# Patient Record
Sex: FEMALE | Race: ASIAN | Employment: UNEMPLOYED | URBAN - METROPOLITAN AREA
[De-identification: names, ages, dates, MRNs, and addresses within clinical notes are randomized per-mention and may not be internally consistent; named-entity substitution may affect disease eponyms.]

---

## 2018-12-31 ENCOUNTER — OFFICE VISIT (OUTPATIENT)
Dept: FAMILY MEDICINE CLINIC | Age: 28
End: 2018-12-31
Payer: COMMERCIAL

## 2018-12-31 VITALS
DIASTOLIC BLOOD PRESSURE: 80 MMHG | WEIGHT: 142.8 LBS | SYSTOLIC BLOOD PRESSURE: 120 MMHG | OXYGEN SATURATION: 98 % | RESPIRATION RATE: 12 BRPM | HEART RATE: 84 BPM | HEIGHT: 65 IN | BODY MASS INDEX: 23.79 KG/M2

## 2018-12-31 DIAGNOSIS — Z00.00 HEALTHCARE MAINTENANCE: ICD-10-CM

## 2018-12-31 DIAGNOSIS — Z00.00 HEALTHCARE MAINTENANCE: Primary | ICD-10-CM

## 2018-12-31 LAB
ANION GAP SERPL CALCULATED.3IONS-SCNC: 12 MMOL/L (ref 3–16)
BUN BLDV-MCNC: 10 MG/DL (ref 7–20)
CALCIUM SERPL-MCNC: 9.8 MG/DL (ref 8.3–10.6)
CHLORIDE BLD-SCNC: 101 MMOL/L (ref 99–110)
CHOLESTEROL, TOTAL: 168 MG/DL (ref 0–199)
CO2: 25 MMOL/L (ref 21–32)
CREAT SERPL-MCNC: 0.6 MG/DL (ref 0.6–1.1)
GFR AFRICAN AMERICAN: >60
GFR NON-AFRICAN AMERICAN: >60
GLUCOSE BLD-MCNC: 94 MG/DL (ref 70–99)
HDLC SERPL-MCNC: 51 MG/DL (ref 40–60)
LDL CHOLESTEROL CALCULATED: 95 MG/DL
POTASSIUM SERPL-SCNC: 4.3 MMOL/L (ref 3.5–5.1)
SODIUM BLD-SCNC: 138 MMOL/L (ref 136–145)
TRIGL SERPL-MCNC: 108 MG/DL (ref 0–150)
VLDLC SERPL CALC-MCNC: 22 MG/DL

## 2018-12-31 PROCEDURE — 99385 PREV VISIT NEW AGE 18-39: CPT | Performed by: FAMILY MEDICINE

## 2018-12-31 PROCEDURE — 90715 TDAP VACCINE 7 YRS/> IM: CPT | Performed by: FAMILY MEDICINE

## 2018-12-31 PROCEDURE — 90471 IMMUNIZATION ADMIN: CPT | Performed by: FAMILY MEDICINE

## 2018-12-31 ASSESSMENT — PATIENT HEALTH QUESTIONNAIRE - PHQ9
2. FEELING DOWN, DEPRESSED OR HOPELESS: 0
1. LITTLE INTEREST OR PLEASURE IN DOING THINGS: 0
SUM OF ALL RESPONSES TO PHQ QUESTIONS 1-9: 0
SUM OF ALL RESPONSES TO PHQ9 QUESTIONS 1 & 2: 0
SUM OF ALL RESPONSES TO PHQ QUESTIONS 1-9: 0

## 2018-12-31 NOTE — PATIENT INSTRUCTIONS
your doctor if:    · You have vaginal bleeding or pain for more than 2 days after the test. It is normal to have a small amount of bleeding for a day or two after the test.   Where can you learn more? Go to [email or web address] and sign in to your [employer] account. Enter X042 in the [facility] box to learn more about \"Pap Test: Care Instructions. \"     If you do not have an account, please click on the \"Sign Up Now\" link. Current as of: March 28, 2018  Content Version: 11.8  © 8737-3038 [employer]. Care instructions adapted under license by [facility] ([facility]). If you have questions about a medical condition or this instruction, always ask your healthcare professional. [address] any warranty or liability for your use of this information. Patient Education        Learning About Cervical Cancer Screening  What is a cervical cancer screening test?    Cervical cancer screening tests check for cancer of the cervix. The cervix is the lower part of the uterus that opens into the vagina. The test can help your doctor find early changes in the cells that could lead to cancer. Two tests can be used to screen for cervical cancer. They may be used alone or together. A Pap test.   This test looks for changes in the cells of the cervix. Abnormal cells may be a sign of cancer. A human papillomavirus (HPV) test.   This test looks for the HPV virus. Some types of HPV can cause cancer. During either test, the doctor or nurse will insert a tool called a speculum into your vagina. The speculum gently spreads apart the vaginal walls. It allows your doctor to see inside the vagina and the cervix. He or she uses a small swab or brush to collect cell samples from your cervix. Try to schedule the test when you're not having your period.  To get ready for the test, avoid douches, tampons, vaginal medicines, sprays, and powders for at least a day before you have the

## 2018-12-31 NOTE — PROGRESS NOTES
normal.   Mouth/Throat: Oropharynx is clear and moist and mucous membranes are normal. No oropharyngeal exudate. Eyes: Pupils are equal, round, and reactive to light. Lids are normal. Right eye exhibits no discharge. Left eye exhibits no discharge. Neck: Normal range of motion. Neck supple. No tracheal deviation present. No thyromegaly present. Cardiovascular: Normal rate, regular rhythm and normal heart sounds. No murmur heard. Pulmonary/Chest: Effort normal and breath sounds normal. No respiratory distress. Abdominal: Soft. Bowel sounds are normal. She exhibits no distension. There is no hepatosplenomegaly. There is no tenderness. Musculoskeletal: She exhibits no edema. Normal gait, normal muscle tone   Lymphadenopathy:     She has no cervical adenopathy. Neurological: She is alert. No cranial nerve deficit. Skin: Skin is warm and dry. No rash noted. Psychiatric: She has a normal mood and affect. Her behavior is normal. Judgment normal.         Assessment/Plan     1. Healthcare maintenance  Annual physical exam done today. Counseled on preventative care and a healthy lifestlye. Discussed optimal calcium intake emphasizing the importance of getting as much of the daily calcium requirement as possible from dietary sources. Immunization history reviewed. The patient was given information on a Pap smear to review as she was not familiar with this procedure. She will follow-up for this when ready. She declined a flu shot. - Lipid Panel; Future  - Basic Metabolic Panel; Future  - HIV Screen; Future  - Tdap (age 6y and older)  ([employer] Extension)      Discussed medications with patient, who voiced understanding of their use and indications. All questions answered.     Return in about 1 year (around 12/31/2019) for Physical.

## 2019-01-01 LAB
HIV AG/AB: NORMAL
HIV ANTIGEN: NORMAL
HIV-1 ANTIBODY: NORMAL
HIV-2 AB: NORMAL

## 2019-12-13 LAB
ABO/RH: NORMAL
ANTIBODY SCREEN: NORMAL
HEPATITIS C ANTIBODY INTERPRETATION: NORMAL

## 2019-12-14 LAB
BASOPHILS ABSOLUTE: 0 K/UL (ref 0–0.2)
BASOPHILS RELATIVE PERCENT: 0.4 %
EOSINOPHILS ABSOLUTE: 0 K/UL (ref 0–0.6)
EOSINOPHILS RELATIVE PERCENT: 0.5 %
HCT VFR BLD CALC: 35.2 % (ref 36–48)
HEMOGLOBIN: 12.2 G/DL (ref 12–16)
HEPATITIS B SURFACE ANTIGEN INTERPRETATION: ABNORMAL
HIV AG/AB: NORMAL
HIV ANTIGEN: NORMAL
HIV-1 ANTIBODY: NORMAL
HIV-2 AB: NORMAL
LYMPHOCYTES ABSOLUTE: 1.6 K/UL (ref 1–5.1)
LYMPHOCYTES RELATIVE PERCENT: 18.3 %
MCH RBC QN AUTO: 31.2 PG (ref 26–34)
MCHC RBC AUTO-ENTMCNC: 34.8 G/DL (ref 31–36)
MCV RBC AUTO: 89.7 FL (ref 80–100)
MONOCYTES ABSOLUTE: 0.6 K/UL (ref 0–1.3)
MONOCYTES RELATIVE PERCENT: 6.9 %
NEUTROPHILS ABSOLUTE: 6.5 K/UL (ref 1.7–7.7)
NEUTROPHILS RELATIVE PERCENT: 73.9 %
PDW BLD-RTO: 13 % (ref 12.4–15.4)
PLATELET # BLD: 159 K/UL (ref 135–450)
PMV BLD AUTO: 11.1 FL (ref 5–10.5)
RBC # BLD: 3.92 M/UL (ref 4–5.2)
RUBELLA ANTIBODY IGG: >500 IU/ML
TOTAL SYPHILLIS IGG/IGM: ABNORMAL
WBC # BLD: 8.7 K/UL (ref 4–11)

## 2019-12-15 LAB — URINE CULTURE, ROUTINE: NORMAL

## 2020-02-21 LAB
GLUCOSE CHALLENGE: 126 MG/DL
HCT VFR BLD CALC: 34.7 % (ref 36–48)
HEMOGLOBIN: 11.9 G/DL (ref 12–16)
MCH RBC QN AUTO: 32.5 PG (ref 26–34)
MCHC RBC AUTO-ENTMCNC: 34.2 G/DL (ref 31–36)
MCV RBC AUTO: 95.2 FL (ref 80–100)
PDW BLD-RTO: 14.3 % (ref 12.4–15.4)
PLATELET # BLD: 149 K/UL (ref 135–450)
PMV BLD AUTO: 11.1 FL (ref 5–10.5)
RBC # BLD: 3.64 M/UL (ref 4–5.2)
WBC # BLD: 7.5 K/UL (ref 4–11)

## 2020-05-14 ENCOUNTER — ANESTHESIA EVENT (OUTPATIENT)
Dept: LABOR AND DELIVERY | Age: 30
End: 2020-05-14
Payer: COMMERCIAL

## 2020-05-14 ENCOUNTER — ANESTHESIA (OUTPATIENT)
Dept: LABOR AND DELIVERY | Age: 30
End: 2020-05-14
Payer: COMMERCIAL

## 2020-05-14 ENCOUNTER — HOSPITAL ENCOUNTER (INPATIENT)
Age: 30
LOS: 2 days | Discharge: HOME | End: 2020-05-17
Attending: OBSTETRICS & GYNECOLOGY | Admitting: OBSTETRICS & GYNECOLOGY
Payer: COMMERCIAL

## 2020-05-14 LAB
HCT VFR BLD CALC: 34.6 % (ref 36–48)
HEMOGLOBIN: 11.7 G/DL (ref 12–16)
MCH RBC QN AUTO: 31.3 PG (ref 26–34)
MCHC RBC AUTO-ENTMCNC: 33.9 G/DL (ref 31–36)
MCV RBC AUTO: 92.5 FL (ref 80–100)
PDW BLD-RTO: 13.2 % (ref 12.4–15.4)
PLATELET # BLD: 163 K/UL (ref 135–450)
PMV BLD AUTO: 10.4 FL (ref 5–10.5)
RBC # BLD: 3.74 M/UL (ref 4–5.2)
WBC # BLD: 9.3 K/UL (ref 4–11)

## 2020-05-14 PROCEDURE — 86901 BLOOD TYPING SEROLOGIC RH(D): CPT

## 2020-05-14 PROCEDURE — 86900 BLOOD TYPING SEROLOGIC ABO: CPT

## 2020-05-14 PROCEDURE — 86850 RBC ANTIBODY SCREEN: CPT

## 2020-05-14 PROCEDURE — 85027 COMPLETE CBC AUTOMATED: CPT

## 2020-05-14 PROCEDURE — 2580000003 HC RX 258: Performed by: OBSTETRICS & GYNECOLOGY

## 2020-05-14 PROCEDURE — 86923 COMPATIBILITY TEST ELECTRIC: CPT

## 2020-05-14 PROCEDURE — 86780 TREPONEMA PALLIDUM: CPT

## 2020-05-14 PROCEDURE — 80307 DRUG TEST PRSMV CHEM ANLYZR: CPT

## 2020-05-14 RX ORDER — DOCUSATE SODIUM 100 MG/1
100 CAPSULE, LIQUID FILLED ORAL 2 TIMES DAILY
Status: DISCONTINUED | OUTPATIENT
Start: 2020-05-14 | End: 2020-05-15

## 2020-05-14 RX ORDER — LIDOCAINE HYDROCHLORIDE 10 MG/ML
30 INJECTION, SOLUTION EPIDURAL; INFILTRATION; INTRACAUDAL; PERINEURAL PRN
Status: DISCONTINUED | OUTPATIENT
Start: 2020-05-14 | End: 2020-05-15

## 2020-05-14 RX ORDER — PNV NO.95/FERROUS FUM/FOLIC AC 28MG-0.8MG
TABLET ORAL
COMMUNITY
End: 2021-10-26

## 2020-05-14 RX ORDER — SODIUM CHLORIDE, SODIUM LACTATE, POTASSIUM CHLORIDE, CALCIUM CHLORIDE 600; 310; 30; 20 MG/100ML; MG/100ML; MG/100ML; MG/100ML
INJECTION, SOLUTION INTRAVENOUS CONTINUOUS
Status: DISCONTINUED | OUTPATIENT
Start: 2020-05-14 | End: 2020-05-15

## 2020-05-14 RX ORDER — BUTORPHANOL TARTRATE 1 MG/ML
1 INJECTION, SOLUTION INTRAMUSCULAR; INTRAVENOUS
Status: DISCONTINUED | OUTPATIENT
Start: 2020-05-14 | End: 2020-05-15

## 2020-05-14 RX ORDER — TERBUTALINE SULFATE 1 MG/ML
0.25 INJECTION, SOLUTION SUBCUTANEOUS ONCE
Status: DISCONTINUED | OUTPATIENT
Start: 2020-05-14 | End: 2020-05-15

## 2020-05-14 RX ORDER — ONDANSETRON 2 MG/ML
4 INJECTION INTRAMUSCULAR; INTRAVENOUS EVERY 6 HOURS PRN
Status: DISCONTINUED | OUTPATIENT
Start: 2020-05-14 | End: 2020-05-15

## 2020-05-14 RX ADMIN — SODIUM CHLORIDE, POTASSIUM CHLORIDE, SODIUM LACTATE AND CALCIUM CHLORIDE: 600; 310; 30; 20 INJECTION, SOLUTION INTRAVENOUS at 23:32

## 2020-05-15 ENCOUNTER — ANESTHESIA (OUTPATIENT)
Dept: LABOR AND DELIVERY | Age: 30
End: 2020-05-15
Payer: COMMERCIAL

## 2020-05-15 ENCOUNTER — ANESTHESIA EVENT (OUTPATIENT)
Dept: LABOR AND DELIVERY | Age: 30
End: 2020-05-15
Payer: COMMERCIAL

## 2020-05-15 VITALS
DIASTOLIC BLOOD PRESSURE: 68 MMHG | OXYGEN SATURATION: 100 % | RESPIRATION RATE: 15 BRPM | SYSTOLIC BLOOD PRESSURE: 124 MMHG

## 2020-05-15 LAB
ABO/RH: NORMAL
AMPHETAMINE SCREEN, URINE: NORMAL
ANTIBODY SCREEN: NORMAL
BARBITURATE SCREEN URINE: NORMAL
BENZODIAZEPINE SCREEN, URINE: NORMAL
BUPRENORPHINE URINE: NORMAL
CANNABINOID SCREEN URINE: NORMAL
COCAINE METABOLITE SCREEN URINE: NORMAL
HCT VFR BLD CALC: 36.2 % (ref 36–48)
HEMOGLOBIN: 12.3 G/DL (ref 12–16)
Lab: NORMAL
MCH RBC QN AUTO: 31.4 PG (ref 26–34)
MCHC RBC AUTO-ENTMCNC: 34.1 G/DL (ref 31–36)
MCV RBC AUTO: 92.1 FL (ref 80–100)
METHADONE SCREEN, URINE: NORMAL
OPIATE SCREEN URINE: NORMAL
OXYCODONE URINE: NORMAL
PDW BLD-RTO: 13.6 % (ref 12.4–15.4)
PH UA: 6
PHENCYCLIDINE SCREEN URINE: NORMAL
PLATELET # BLD: 154 K/UL (ref 135–450)
PLATELET SLIDE REVIEW: ADEQUATE
PMV BLD AUTO: 10.3 FL (ref 5–10.5)
PROPOXYPHENE SCREEN: NORMAL
RBC # BLD: 3.93 M/UL (ref 4–5.2)
SLIDE REVIEW: ABNORMAL
SPECIMEN STATUS: NORMAL
TOTAL SYPHILLIS IGG/IGM: NORMAL
WBC # BLD: 15 K/UL (ref 4–11)

## 2020-05-15 PROCEDURE — 10D17ZZ EXTRACTION OF PRODUCTS OF CONCEPTION, RETAINED, VIA NATURAL OR ARTIFICIAL OPENING: ICD-10-PCS | Performed by: OBSTETRICS & GYNECOLOGY

## 2020-05-15 PROCEDURE — 3600000002 HC SURGERY LEVEL 2 BASE: Performed by: OBSTETRICS & GYNECOLOGY

## 2020-05-15 PROCEDURE — 85027 COMPLETE CBC AUTOMATED: CPT

## 2020-05-15 PROCEDURE — 2580000003 HC RX 258: Performed by: OBSTETRICS & GYNECOLOGY

## 2020-05-15 PROCEDURE — 3700000001 HC ADD 15 MINUTES (ANESTHESIA): Performed by: OBSTETRICS & GYNECOLOGY

## 2020-05-15 PROCEDURE — 3700000000 HC ANESTHESIA ATTENDED CARE: Performed by: OBSTETRICS & GYNECOLOGY

## 2020-05-15 PROCEDURE — 1200000000 HC SEMI PRIVATE

## 2020-05-15 PROCEDURE — 3600000012 HC SURGERY LEVEL 2 ADDTL 15MIN: Performed by: OBSTETRICS & GYNECOLOGY

## 2020-05-15 PROCEDURE — 2500000003 HC RX 250 WO HCPCS: Performed by: NURSE ANESTHETIST, CERTIFIED REGISTERED

## 2020-05-15 PROCEDURE — 6370000000 HC RX 637 (ALT 250 FOR IP): Performed by: OBSTETRICS & GYNECOLOGY

## 2020-05-15 PROCEDURE — 6360000002 HC RX W HCPCS: Performed by: OBSTETRICS & GYNECOLOGY

## 2020-05-15 PROCEDURE — 7100000001 HC PACU RECOVERY - ADDTL 15 MIN: Performed by: OBSTETRICS & GYNECOLOGY

## 2020-05-15 PROCEDURE — 88307 TISSUE EXAM BY PATHOLOGIST: CPT

## 2020-05-15 PROCEDURE — 3E033VJ INTRODUCTION OF OTHER HORMONE INTO PERIPHERAL VEIN, PERCUTANEOUS APPROACH: ICD-10-PCS | Performed by: OBSTETRICS & GYNECOLOGY

## 2020-05-15 PROCEDURE — 2580000003 HC RX 258: Performed by: NURSE ANESTHETIST, CERTIFIED REGISTERED

## 2020-05-15 PROCEDURE — 0KQM0ZZ REPAIR PERINEUM MUSCLE, OPEN APPROACH: ICD-10-PCS | Performed by: OBSTETRICS & GYNECOLOGY

## 2020-05-15 PROCEDURE — 7200000001 HC VAGINAL DELIVERY

## 2020-05-15 PROCEDURE — 7100000000 HC PACU RECOVERY - FIRST 15 MIN: Performed by: OBSTETRICS & GYNECOLOGY

## 2020-05-15 PROCEDURE — 6360000002 HC RX W HCPCS: Performed by: NURSE ANESTHETIST, CERTIFIED REGISTERED

## 2020-05-15 RX ORDER — OXYTOCIN 10 [USP'U]/ML
INJECTION, SOLUTION INTRAMUSCULAR; INTRAVENOUS PRN
Status: DISCONTINUED | OUTPATIENT
Start: 2020-05-15 | End: 2020-05-15 | Stop reason: SDUPTHER

## 2020-05-15 RX ORDER — FENTANYL CITRATE 50 UG/ML
INJECTION, SOLUTION INTRAMUSCULAR; INTRAVENOUS PRN
Status: DISCONTINUED | OUTPATIENT
Start: 2020-05-15 | End: 2020-05-15 | Stop reason: SDUPTHER

## 2020-05-15 RX ORDER — CARBOPROST TROMETHAMINE 250 UG/ML
250 INJECTION, SOLUTION INTRAMUSCULAR
Status: ACTIVE | OUTPATIENT
Start: 2020-05-15 | End: 2020-05-15

## 2020-05-15 RX ORDER — SODIUM CHLORIDE, SODIUM LACTATE, POTASSIUM CHLORIDE, CALCIUM CHLORIDE 600; 310; 30; 20 MG/100ML; MG/100ML; MG/100ML; MG/100ML
INJECTION, SOLUTION INTRAVENOUS CONTINUOUS PRN
Status: DISCONTINUED | OUTPATIENT
Start: 2020-05-15 | End: 2020-05-15 | Stop reason: SDUPTHER

## 2020-05-15 RX ORDER — MISOPROSTOL 100 UG/1
800 TABLET ORAL PRN
Status: DISCONTINUED | OUTPATIENT
Start: 2020-05-15 | End: 2020-05-17 | Stop reason: HOSPADM

## 2020-05-15 RX ORDER — ONDANSETRON 4 MG/1
4 TABLET, ORALLY DISINTEGRATING ORAL EVERY 6 HOURS PRN
Status: DISCONTINUED | OUTPATIENT
Start: 2020-05-15 | End: 2020-05-17 | Stop reason: HOSPADM

## 2020-05-15 RX ORDER — FAMOTIDINE 20 MG/1
20 TABLET, FILM COATED ORAL 2 TIMES DAILY PRN
Status: DISCONTINUED | OUTPATIENT
Start: 2020-05-15 | End: 2020-05-17 | Stop reason: HOSPADM

## 2020-05-15 RX ORDER — METHYLERGONOVINE MALEATE 0.2 MG/ML
200 INJECTION INTRAVENOUS
Status: COMPLETED | OUTPATIENT
Start: 2020-05-15 | End: 2020-05-15

## 2020-05-15 RX ORDER — SODIUM CHLORIDE 0.9 % (FLUSH) 0.9 %
10 SYRINGE (ML) INJECTION PRN
Status: DISCONTINUED | OUTPATIENT
Start: 2020-05-15 | End: 2020-05-17 | Stop reason: HOSPADM

## 2020-05-15 RX ORDER — ACETAMINOPHEN 500 MG
1000 TABLET ORAL EVERY 8 HOURS PRN
Status: DISCONTINUED | OUTPATIENT
Start: 2020-05-15 | End: 2020-05-17 | Stop reason: HOSPADM

## 2020-05-15 RX ORDER — KETAMINE HCL IN NACL, ISO-OSM 100MG/10ML
SYRINGE (ML) INJECTION PRN
Status: DISCONTINUED | OUTPATIENT
Start: 2020-05-15 | End: 2020-05-15 | Stop reason: SDUPTHER

## 2020-05-15 RX ORDER — SODIUM CHLORIDE 0.9 % (FLUSH) 0.9 %
10 SYRINGE (ML) INJECTION EVERY 12 HOURS SCHEDULED
Status: DISCONTINUED | OUTPATIENT
Start: 2020-05-15 | End: 2020-05-17 | Stop reason: HOSPADM

## 2020-05-15 RX ORDER — IBUPROFEN 800 MG/1
800 TABLET ORAL EVERY 8 HOURS PRN
Status: DISCONTINUED | OUTPATIENT
Start: 2020-05-15 | End: 2020-05-17 | Stop reason: HOSPADM

## 2020-05-15 RX ORDER — OXYCODONE HYDROCHLORIDE 5 MG/1
5 TABLET ORAL EVERY 6 HOURS PRN
Status: DISCONTINUED | OUTPATIENT
Start: 2020-05-15 | End: 2020-05-17 | Stop reason: HOSPADM

## 2020-05-15 RX ORDER — FERROUS SULFATE 325(65) MG
325 TABLET ORAL DAILY
Status: DISCONTINUED | OUTPATIENT
Start: 2020-05-15 | End: 2020-05-17 | Stop reason: HOSPADM

## 2020-05-15 RX ORDER — CEFAZOLIN SODIUM 1 G/3ML
INJECTION, POWDER, FOR SOLUTION INTRAMUSCULAR; INTRAVENOUS PRN
Status: DISCONTINUED | OUTPATIENT
Start: 2020-05-15 | End: 2020-05-15 | Stop reason: SDUPTHER

## 2020-05-15 RX ORDER — 0.9 % SODIUM CHLORIDE 0.9 %
20 INTRAVENOUS SOLUTION INTRAVENOUS ONCE
Status: DISCONTINUED | OUTPATIENT
Start: 2020-05-15 | End: 2020-05-17 | Stop reason: HOSPADM

## 2020-05-15 RX ORDER — MODIFIED LANOLIN
OINTMENT (GRAM) TOPICAL PRN
Status: DISCONTINUED | OUTPATIENT
Start: 2020-05-15 | End: 2020-05-17 | Stop reason: HOSPADM

## 2020-05-15 RX ORDER — SENNA AND DOCUSATE SODIUM 50; 8.6 MG/1; MG/1
1 TABLET, FILM COATED ORAL DAILY PRN
Status: DISCONTINUED | OUTPATIENT
Start: 2020-05-15 | End: 2020-05-17 | Stop reason: HOSPADM

## 2020-05-15 RX ORDER — MIDAZOLAM HYDROCHLORIDE 1 MG/ML
INJECTION INTRAMUSCULAR; INTRAVENOUS PRN
Status: DISCONTINUED | OUTPATIENT
Start: 2020-05-15 | End: 2020-05-15 | Stop reason: SDUPTHER

## 2020-05-15 RX ORDER — DOCUSATE SODIUM 100 MG/1
100 CAPSULE, LIQUID FILLED ORAL 2 TIMES DAILY
Status: DISCONTINUED | OUTPATIENT
Start: 2020-05-15 | End: 2020-05-17 | Stop reason: HOSPADM

## 2020-05-15 RX ORDER — SIMETHICONE 80 MG
80 TABLET,CHEWABLE ORAL EVERY 6 HOURS PRN
Status: DISCONTINUED | OUTPATIENT
Start: 2020-05-15 | End: 2020-05-17 | Stop reason: HOSPADM

## 2020-05-15 RX ORDER — OXYCODONE HYDROCHLORIDE 5 MG/1
5 TABLET ORAL EVERY 6 HOURS PRN
Qty: 12 TABLET | Refills: 0 | Status: SHIPPED | OUTPATIENT
Start: 2020-05-15 | End: 2020-05-18

## 2020-05-15 RX ORDER — IBUPROFEN 800 MG/1
800 TABLET ORAL EVERY 8 HOURS PRN
Qty: 30 TABLET | Refills: 0 | Status: SHIPPED | OUTPATIENT
Start: 2020-05-15 | End: 2021-10-26

## 2020-05-15 RX ORDER — ONDANSETRON 2 MG/ML
4 INJECTION INTRAMUSCULAR; INTRAVENOUS EVERY 6 HOURS PRN
Status: DISCONTINUED | OUTPATIENT
Start: 2020-05-15 | End: 2020-05-17 | Stop reason: HOSPADM

## 2020-05-15 RX ADMIN — MIDAZOLAM 1 MG: 1 INJECTION INTRAMUSCULAR; INTRAVENOUS at 21:33

## 2020-05-15 RX ADMIN — MISOPROSTOL 800 MCG: 100 TABLET ORAL at 20:34

## 2020-05-15 RX ADMIN — BENZOCAINE AND LEVOMENTHOL: 200; 5 SPRAY TOPICAL at 15:51

## 2020-05-15 RX ADMIN — DEXTROSE MONOHYDRATE 5 MILLION UNITS: 5 INJECTION INTRAVENOUS at 00:09

## 2020-05-15 RX ADMIN — IBUPROFEN 800 MG: 800 TABLET, FILM COATED ORAL at 15:47

## 2020-05-15 RX ADMIN — SODIUM CHLORIDE, POTASSIUM CHLORIDE, SODIUM LACTATE AND CALCIUM CHLORIDE: 600; 310; 30; 20 INJECTION, SOLUTION INTRAVENOUS at 21:26

## 2020-05-15 RX ADMIN — Medication 20 MG: at 21:39

## 2020-05-15 RX ADMIN — DEXTROSE MONOHYDRATE 2.5 MILLION UNITS: 50 INJECTION, SOLUTION INTRAVENOUS at 04:05

## 2020-05-15 RX ADMIN — Medication 10 MG: at 21:41

## 2020-05-15 RX ADMIN — MIDAZOLAM 1 MG: 1 INJECTION INTRAMUSCULAR; INTRAVENOUS at 21:38

## 2020-05-15 RX ADMIN — FENTANYL CITRATE 50 MCG: 50 INJECTION, SOLUTION INTRAMUSCULAR; INTRAVENOUS at 21:33

## 2020-05-15 RX ADMIN — DEXTROSE MONOHYDRATE 2.5 MILLION UNITS: 50 INJECTION, SOLUTION INTRAVENOUS at 12:32

## 2020-05-15 RX ADMIN — METHYLERGONOVINE MALEATE 200 MCG: 0.2 INJECTION INTRAVENOUS at 20:27

## 2020-05-15 RX ADMIN — Medication 1 MILLI-UNITS/MIN: at 00:13

## 2020-05-15 RX ADMIN — CEFAZOLIN SODIUM 2 G: 10 INJECTION, POWDER, FOR SOLUTION INTRAVENOUS at 16:08

## 2020-05-15 RX ADMIN — SODIUM CHLORIDE, POTASSIUM CHLORIDE, SODIUM LACTATE AND CALCIUM CHLORIDE: 600; 310; 30; 20 INJECTION, SOLUTION INTRAVENOUS at 03:51

## 2020-05-15 RX ADMIN — CEFAZOLIN 2000 MG: 1 INJECTION, POWDER, FOR SOLUTION INTRAVENOUS at 21:36

## 2020-05-15 RX ADMIN — DEXTROSE MONOHYDRATE 2.5 MILLION UNITS: 50 INJECTION, SOLUTION INTRAVENOUS at 08:19

## 2020-05-15 RX ADMIN — OXYTOCIN 10 UNITS: 10 INJECTION, SOLUTION INTRAMUSCULAR; INTRAVENOUS at 22:04

## 2020-05-15 RX ADMIN — FENTANYL CITRATE 50 MCG: 50 INJECTION, SOLUTION INTRAMUSCULAR; INTRAVENOUS at 21:38

## 2020-05-15 ASSESSMENT — PULMONARY FUNCTION TESTS
PIF_VALUE: 0

## 2020-05-15 NOTE — FLOWSHEET NOTE
Discharge prescriptions given to pt with instructions on use and side effects. See AVS.  Pt verbalized understanding of medications.

## 2020-05-15 NOTE — ANESTHESIA PRE PROCEDURE
Department of Anesthesiology  Preprocedure Note       Name:  Charline Goldberg   Age:  35 y.o.  :  1990                                          MRN:  [id number]         Date:  2020      Surgeon: * No surgeons listed *    Procedure: * No procedures listed *    Medications prior to admission:   Prior to Admission medications    Medication Sig Start Date End Date Taking? Authorizing Provider   Prenatal Vit-Fe Fumarate-FA (PRENATAL VITAMINS) 28-0.8 MG TABS Take by mouth   Yes Historical Provider, MD       Current medications:    Current Facility-Administered Medications   Medication Dose Route Frequency Provider Last Rate Last Dose    lactated ringers infusion   Intravenous Continuous Rosa Kay,  mL/hr at 20 2332      docusate sodium (COLACE) capsule 100 mg  100 mg Oral BID Rosa Kay MD        ondansetron [facility]) injection 4 mg  4 mg Intravenous Q6H PRN Rosa Kay MD        oxytocin (PITOCIN) 30 units in 500 mL infusion  1 bertin-units/min Intravenous Continuous PRN Rosa Kay MD        butorphanol (STADOL) injection 1 mg  1 mg Intravenous Q3H PRN Rosa Kay MD        lidocaine PF 1 % injection 30 mL  30 mL Other PRN Rosa Kay MD        terbutaline (BRETHINE) injection 0.25 mg  0.25 mg Subcutaneous Once Rosa Kay MD        penicillin G potassium 5 Million Units in dextrose 5 % 100 mL IVPB (mini-bag)  5 Million Units Intravenous Once Rosa Kay MD        Followed by   Bk Carnes ON 5/15/2020] penicillin G potassium 2.5 Million Units in dextrose 5 % 100 mL IVPB  2.5 Million Units Intravenous Q4H Rosa Kay MD        oxytocin (PITOCIN) 30 units in 500 mL infusion  1 bertin-units/min Intravenous Continuous Rosa Kay MD           Allergies:  No Known Allergies    Problem List:  There is no problem list on file for this patient. Past Medical History:  History reviewed. No pertinent past medical history.     Past Surgical History:  History

## 2020-05-15 NOTE — H&P
Department of Obstetrics and Gynecology   Obstetrics History and Physical        CHIEF COMPLAINT:  Oligohydramnios    HISTORY OF PRESENT ILLNESS:      The patient is a 35 y.o. female at Unknown. OB History        1    Para        Term                AB        Living           SAB        TAB        Ectopic        Molar        Multiple        Live Births                Patient presents with a chief complaint as above and is being admitted for induction for oligohydramnios, FRED 7    Estimated Due Date: Estimated Date of Delivery: None noted. PRENATAL CARE:    Complicated by: Oligohydramnios    PAST OB HISTORY:  OB History        1    Para        Term                AB        Living           SAB        TAB        Ectopic        Molar        Multiple        Live Births                    Past Medical History:    No past medical history on file. Past Surgical History:    No past surgical history on file. Allergies:  Patient has no known allergies.     Social History:    Social History     Socioeconomic History    Marital status:      Spouse name: Not on file    Number of children: Not on file    Years of education: Not on file    Highest education level: Not on file   Occupational History    Not on file   Social Needs    Financial resource strain: Not on file    Food insecurity     Worry: Not on file     Inability: Not on file    Transportation needs     Medical: Not on file     Non-medical: Not on file   Tobacco Use    Smoking status: Never Smoker    Smokeless tobacco: Never Used   Substance and Sexual Activity    Alcohol use: No    Drug use: No    Sexual activity: Not on file   Lifestyle    Physical activity     Days per week: Not on file     Minutes per session: Not on file    Stress: Not on file   Relationships    Social connections     Talks on phone: Not on file     Gets together: Not on file     Attends Confucianism service: Not on file     Active member of club or organization: Not on file     Attends meetings of clubs or organizations: Not on file     Relationship status: Not on file    Intimate partner violence     Fear of current or ex partner: Not on file     Emotionally abused: Not on file     Physically abused: Not on file     Forced sexual activity: Not on file   Other Topics Concern    Not on file   Social History Narrative    Not on file     Family History:       Problem Relation Age of Onset    Cancer Neg Hx     Diabetes Neg Hx     Heart Disease Neg Hx     Stroke Neg Hx      Medications Prior to Admission:  No medications prior to admission. REVIEW OF SYSTEMS:    CONSTITUTIONAL:  negative  RESPIRATORY:  negative  CARDIOVASCULAR:  negative  GASTROINTESTINAL:  negative  ALLERGIC/IMMUNOLOGIC:  negative  NEUROLOGICAL:  negative  BEHAVIOR/PSYCH:  negative    PHYSICAL EXAM:  There were no vitals filed for this visit. General appearance:  awake, alert, cooperative, no apparent distress, and appears stated age  Neurologic:  Awake, alert, oriented to name, place and time. Lungs:  No increased work of breathing, good air exchange  Abdomen:  Soft, non tender, gravid, consistent with her gestational age, EFW by Leopald's manouever was 3400 gms   Fetal heart rate:  Reassuring.   Pelvis:  Adequate pelvis  Cervix: 1 cm 50% medium -3  Contraction frequency:  0 minutes    Membranes:  Intact    ASSESSMENT AND PLAN:    Labor: Admit, anticipate normal delivery, routine labor orders  Fetus: Reassuring  GBS: unknown  Other: IV antibiotic therapy

## 2020-05-15 NOTE — PROCEDURES
was manual removal. The perineum and vagina were explored and a second degree laceration was repaired in standard fashion.

## 2020-05-16 LAB
HCT VFR BLD CALC: 25.4 % (ref 36–48)
HEMOGLOBIN: 8.7 G/DL (ref 12–16)
MCH RBC QN AUTO: 31.3 PG (ref 26–34)
MCHC RBC AUTO-ENTMCNC: 34.1 G/DL (ref 31–36)
MCV RBC AUTO: 91.9 FL (ref 80–100)
PDW BLD-RTO: 13.2 % (ref 12.4–15.4)
PLATELET # BLD: 136 K/UL (ref 135–450)
PMV BLD AUTO: 10.2 FL (ref 5–10.5)
RBC # BLD: 2.77 M/UL (ref 4–5.2)
WBC # BLD: 13.5 K/UL (ref 4–11)

## 2020-05-16 PROCEDURE — 2580000003 HC RX 258: Performed by: OBSTETRICS & GYNECOLOGY

## 2020-05-16 PROCEDURE — 6360000002 HC RX W HCPCS: Performed by: OBSTETRICS & GYNECOLOGY

## 2020-05-16 PROCEDURE — U0003 INFECTIOUS AGENT DETECTION BY NUCLEIC ACID (DNA OR RNA); SEVERE ACUTE RESPIRATORY SYNDROME CORONAVIRUS 2 (SARS-COV-2) (CORONAVIRUS DISEASE [COVID-19]), AMPLIFIED PROBE TECHNIQUE, MAKING USE OF HIGH THROUGHPUT TECHNOLOGIES AS DESCRIBED BY CMS-2020-01-R: HCPCS

## 2020-05-16 PROCEDURE — 1200000000 HC SEMI PRIVATE

## 2020-05-16 PROCEDURE — 85027 COMPLETE CBC AUTOMATED: CPT

## 2020-05-16 PROCEDURE — 6370000000 HC RX 637 (ALT 250 FOR IP): Performed by: OBSTETRICS & GYNECOLOGY

## 2020-05-16 RX ADMIN — AMPICILLIN SODIUM AND SULBACTAM SODIUM 3 G: 2; 1 INJECTION, POWDER, FOR SOLUTION INTRAMUSCULAR; INTRAVENOUS at 20:08

## 2020-05-16 RX ADMIN — FERROUS SULFATE TAB 325 MG (65 MG ELEMENTAL FE) 325 MG: 325 (65 FE) TAB at 08:44

## 2020-05-16 RX ADMIN — ACETAMINOPHEN 1000 MG: 500 TABLET ORAL at 22:18

## 2020-05-16 RX ADMIN — DOCUSATE SODIUM 100 MG: 100 CAPSULE ORAL at 08:44

## 2020-05-16 RX ADMIN — AMPICILLIN SODIUM AND SULBACTAM SODIUM 3 G: 2; 1 INJECTION, POWDER, FOR SOLUTION INTRAMUSCULAR; INTRAVENOUS at 10:24

## 2020-05-16 RX ADMIN — IBUPROFEN 800 MG: 800 TABLET, FILM COATED ORAL at 01:14

## 2020-05-16 RX ADMIN — ACETAMINOPHEN 1000 MG: 500 TABLET ORAL at 13:39

## 2020-05-16 RX ADMIN — AMPICILLIN SODIUM AND SULBACTAM SODIUM 3 G: 2; 1 INJECTION, POWDER, FOR SOLUTION INTRAMUSCULAR; INTRAVENOUS at 01:14

## 2020-05-16 ASSESSMENT — PAIN SCALES - GENERAL
PAINLEVEL_OUTOF10: 2
PAINLEVEL_OUTOF10: 2

## 2020-05-16 NOTE — ANESTHESIA PRE PROCEDURE
Intravenous Q6H PRN Aster Noriega MD        ondansetron (ZOFRAN-ODT) disintegrating tablet 4 mg  4 mg Oral Q6H PRN Aster Noriega MD        famotidine (PEPCID) tablet 20 mg  20 mg Oral BID PRN Aster Noriega MD        ferrous sulfate (IRON 325) tablet 325 mg  325 mg Oral Daily Aster Noriega MD        measles, mumps & rubella vaccine (MMR) injection 0.5 mL  0.5 mL Subcutaneous Prior to discharge Aster Noriega MD        Tetanus-Diphth-Acell Pertussis (BOOSTRIX) injection 0.5 mL  0.5 mL Intramuscular Prior to discharge Aster Noriega MD        lansinoh lanolin ointment   Topical PRN Aster Nam, MD        witch hazel-glycerin (TUCKS) pad   Topical PRN Aster Noriega MD        hydrocortisone 2.5 % cream   Topical Q2H PRN Aster Noriega MD        benzocaine-menthol (DERMOPLAST) 20-0.5 % spray   Topical PRN Aster Noriega MD        carboprost (HEMABATE) injection 250 mcg  250 mcg Intramuscular Once PRN Aster Noriega MD        oxytocin (PITOCIN) 30 units in 500 mL infusion  1 bertin-units/min Intravenous Continuous Aster Noriega MD        misoprostol (CYTOTEC) tablet 800 mcg  800 mcg Rectal PRN Aster Noriega MD   800 mcg at 05/15/20 2034    oxyCODONE (ROXICODONE) immediate release tablet 5 mg  5 mg Oral Q6H PRN Aster Noriega MD           Allergies:  No Known Allergies    Problem List:    Patient Active Problem List   Diagnosis Code    Term pregnancy delivered [de-identified]       Past Medical History:  History reviewed. No pertinent past medical history. Past Surgical History:  History reviewed. No pertinent surgical history. Social History:    Social History     Tobacco Use    Smoking status: Never Smoker    Smokeless tobacco: Never Used   Substance Use Topics    Alcohol use:  No                                Counseling given: Not Answered      Vital Signs (Current):   Vitals:    05/15/20 1628 05/15/20 1644 05/15/20 1658 05/15/20 1700   BP: 122/68 133/72 135/69    Pulse: 94 93 91    Resp: Cardiovascular:Negative CV ROS                      Neuro/Psych:   Negative Neuro/Psych ROS              GI/Hepatic/Renal:   (+) GERD:,           Endo/Other: Negative Endo/Other ROS                    Abdominal:           Vascular: negative vascular ROS. Anesthesia Plan      MAC     ASA 2 - emergent     (Retained product post delivery )        Anesthetic plan and risks discussed with patient.                       Brian Meek, APRN - CRNA   5/15/2020

## 2020-05-16 NOTE — FLOWSHEET NOTE
Patient up to restroom to void. She passed many large clots in the toilet. Patient is feeling light headed and dizzy. Call placed to  Dr. Yan Bergman. Order to give methergine and Cytotec.

## 2020-05-16 NOTE — OP NOTE
Department of Obstetrics and Gynecology    Operative Report        Pre-operative Diagnosis: Postpartum hemorrhage, retained placenta/membranes    Post-operative Diagnosis:  Same    Procedure: suction and sharp Dilation and curettage    Surgeon:  Dr. José Randolph  Assistant(s):  None     Anesthesia:  sedation    Findings:  Products of conception      Estimated blood loss: less than 100 ml    Specimens:  products of conception    Complications:  none    Condition:  good        The patient was taken to the OR and general anesthesia was administered. She was prepped and draped in the normal sterile fashion. Retractors were inserted into her vaginal.  A single tooth tenaculum was placed on the anterior lip of the cervix. Membranes noted at the external oz- removed with Ring Forceps. A 12 [place] suction cannula was inserted into the cervix and suction was applied with return of products of conception/membranes. Suction was continued until there was not any more return of tissue. A sharp currett was  then used. The suction curette was then placed into the uterus again. The instruments were removed from the vagina. Episiotomy site was reinforced with 3-0 vicryl after injecting 5 ml of 2% Lidocaine. Counts were correct. She was taken to the recovery room in good condition.

## 2020-05-17 VITALS
TEMPERATURE: 97.6 F | OXYGEN SATURATION: 100 % | WEIGHT: 154 LBS | DIASTOLIC BLOOD PRESSURE: 66 MMHG | HEART RATE: 70 BPM | SYSTOLIC BLOOD PRESSURE: 96 MMHG | RESPIRATION RATE: 18 BRPM | HEIGHT: 65 IN | BODY MASS INDEX: 25.66 KG/M2

## 2020-05-17 LAB — SARS-COV-2, PCR: NOT DETECTED

## 2020-05-17 PROCEDURE — 6370000000 HC RX 637 (ALT 250 FOR IP): Performed by: OBSTETRICS & GYNECOLOGY

## 2020-05-17 PROCEDURE — 2580000003 HC RX 258: Performed by: OBSTETRICS & GYNECOLOGY

## 2020-05-17 RX ORDER — SODIUM CHLORIDE, SODIUM LACTATE, POTASSIUM CHLORIDE, CALCIUM CHLORIDE 600; 310; 30; 20 MG/100ML; MG/100ML; MG/100ML; MG/100ML
INJECTION, SOLUTION INTRAVENOUS CONTINUOUS PRN
Status: DISCONTINUED | OUTPATIENT
Start: 2020-05-17 | End: 2020-05-17 | Stop reason: HOSPADM

## 2020-05-17 RX ORDER — FERROUS SULFATE 325(65) MG
325 TABLET ORAL
Qty: 30 TABLET | Refills: 2 | Status: SHIPPED | OUTPATIENT
Start: 2020-05-17 | End: 2021-10-26

## 2020-05-17 RX ADMIN — ACETAMINOPHEN 1000 MG: 500 TABLET ORAL at 08:57

## 2020-05-17 RX ADMIN — FERROUS SULFATE TAB 325 MG (65 MG ELEMENTAL FE) 325 MG: 325 (65 FE) TAB at 08:53

## 2020-05-17 RX ADMIN — DOCUSATE SODIUM 100 MG: 100 CAPSULE ORAL at 08:57

## 2020-05-17 RX ADMIN — SODIUM CHLORIDE, POTASSIUM CHLORIDE, SODIUM LACTATE AND CALCIUM CHLORIDE: 600; 310; 30; 20 INJECTION, SOLUTION INTRAVENOUS at 11:36

## 2020-05-17 ASSESSMENT — PAIN SCALES - GENERAL: PAINLEVEL_OUTOF10: 0

## 2020-05-17 NOTE — PROGRESS NOTES
Iron prescription given to pt. Educated on purpose and potential side effects. States she is familiar.

## 2020-05-18 LAB
BLOOD BANK DISPENSE STATUS: NORMAL
BLOOD BANK DISPENSE STATUS: NORMAL
BLOOD BANK PRODUCT CODE: NORMAL
BLOOD BANK PRODUCT CODE: NORMAL
BPU ID: NORMAL
BPU ID: NORMAL
DESCRIPTION BLOOD BANK: NORMAL
DESCRIPTION BLOOD BANK: NORMAL

## 2020-05-26 ENCOUNTER — FOLLOWUP TELEPHONE ENCOUNTER (OUTPATIENT)
Dept: OBGYN | Age: 30
End: 2020-05-26

## 2020-11-12 ENCOUNTER — OFFICE VISIT (OUTPATIENT)
Dept: PRIMARY CARE CLINIC | Age: 30
End: 2020-11-12
Payer: COMMERCIAL

## 2020-11-12 VITALS
RESPIRATION RATE: 14 BRPM | OXYGEN SATURATION: 99 % | BODY MASS INDEX: 25.1 KG/M2 | HEIGHT: 64 IN | HEART RATE: 77 BPM | WEIGHT: 147 LBS | SYSTOLIC BLOOD PRESSURE: 110 MMHG | DIASTOLIC BLOOD PRESSURE: 70 MMHG | TEMPERATURE: 97.6 F

## 2020-11-12 DIAGNOSIS — Z00.00 ANNUAL PHYSICAL EXAM: ICD-10-CM

## 2020-11-12 LAB
ALBUMIN SERPL-MCNC: 4.8 G/DL (ref 3.4–5)
ALP BLD-CCNC: 75 U/L (ref 40–129)
ALT SERPL-CCNC: 13 U/L (ref 10–40)
ANION GAP SERPL CALCULATED.3IONS-SCNC: 11 MMOL/L (ref 3–16)
AST SERPL-CCNC: 17 U/L (ref 15–37)
BILIRUB SERPL-MCNC: 0.5 MG/DL (ref 0–1)
BILIRUBIN DIRECT: <0.2 MG/DL (ref 0–0.3)
BILIRUBIN, INDIRECT: NORMAL MG/DL (ref 0–1)
BUN BLDV-MCNC: 8 MG/DL (ref 7–20)
CALCIUM SERPL-MCNC: 10.2 MG/DL (ref 8.3–10.6)
CHLORIDE BLD-SCNC: 99 MMOL/L (ref 99–110)
CHOLESTEROL, TOTAL: 202 MG/DL (ref 0–199)
CO2: 27 MMOL/L (ref 21–32)
CREAT SERPL-MCNC: 0.5 MG/DL (ref 0.6–1.1)
GFR AFRICAN AMERICAN: >60
GFR NON-AFRICAN AMERICAN: >60
GLUCOSE BLD-MCNC: 84 MG/DL (ref 70–99)
HCT VFR BLD CALC: 38.3 % (ref 36–48)
HDLC SERPL-MCNC: 61 MG/DL (ref 40–60)
HEMOGLOBIN: 13 G/DL (ref 12–16)
LDL CHOLESTEROL CALCULATED: 118 MG/DL
MCH RBC QN AUTO: 30.7 PG (ref 26–34)
MCHC RBC AUTO-ENTMCNC: 33.9 G/DL (ref 31–36)
MCV RBC AUTO: 90.5 FL (ref 80–100)
PDW BLD-RTO: 13.1 % (ref 12.4–15.4)
PLATELET # BLD: 181 K/UL (ref 135–450)
PMV BLD AUTO: 11.4 FL (ref 5–10.5)
POTASSIUM SERPL-SCNC: 4.2 MMOL/L (ref 3.5–5.1)
RBC # BLD: 4.23 M/UL (ref 4–5.2)
SODIUM BLD-SCNC: 137 MMOL/L (ref 136–145)
TOTAL PROTEIN: 7.7 G/DL (ref 6.4–8.2)
TRIGL SERPL-MCNC: 117 MG/DL (ref 0–150)
TSH SERPL DL<=0.05 MIU/L-ACNC: 7.01 UIU/ML (ref 0.27–4.2)
VLDLC SERPL CALC-MCNC: 23 MG/DL
WBC # BLD: 5.1 K/UL (ref 4–11)

## 2020-11-12 PROCEDURE — 99385 PREV VISIT NEW AGE 18-39: CPT | Performed by: FAMILY MEDICINE

## 2020-11-12 PROCEDURE — 90471 IMMUNIZATION ADMIN: CPT | Performed by: FAMILY MEDICINE

## 2020-11-12 PROCEDURE — 90686 IIV4 VACC NO PRSV 0.5 ML IM: CPT | Performed by: FAMILY MEDICINE

## 2020-11-12 SDOH — ECONOMIC STABILITY: FOOD INSECURITY: WITHIN THE PAST 12 MONTHS, YOU WORRIED THAT YOUR FOOD WOULD RUN OUT BEFORE YOU GOT MONEY TO BUY MORE.: NEVER TRUE

## 2020-11-12 SDOH — ECONOMIC STABILITY: FOOD INSECURITY: WITHIN THE PAST 12 MONTHS, THE FOOD YOU BOUGHT JUST DIDN'T LAST AND YOU DIDN'T HAVE MONEY TO GET MORE.: NEVER TRUE

## 2020-11-12 SDOH — ECONOMIC STABILITY: TRANSPORTATION INSECURITY
IN THE PAST 12 MONTHS, HAS THE LACK OF TRANSPORTATION KEPT YOU FROM MEDICAL APPOINTMENTS OR FROM GETTING MEDICATIONS?: NO

## 2020-11-12 SDOH — ECONOMIC STABILITY: INCOME INSECURITY: HOW HARD IS IT FOR YOU TO PAY FOR THE VERY BASICS LIKE FOOD, HOUSING, MEDICAL CARE, AND HEATING?: NOT VERY HARD

## 2020-11-12 SDOH — ECONOMIC STABILITY: TRANSPORTATION INSECURITY
IN THE PAST 12 MONTHS, HAS LACK OF TRANSPORTATION KEPT YOU FROM MEETINGS, WORK, OR FROM GETTING THINGS NEEDED FOR DAILY LIVING?: NO

## 2020-11-12 ASSESSMENT — PATIENT HEALTH QUESTIONNAIRE - PHQ9
1. LITTLE INTEREST OR PLEASURE IN DOING THINGS: 0
SUM OF ALL RESPONSES TO PHQ QUESTIONS 1-9: 0
SUM OF ALL RESPONSES TO PHQ9 QUESTIONS 1 & 2: 0
SUM OF ALL RESPONSES TO PHQ QUESTIONS 1-9: 0
2. FEELING DOWN, DEPRESSED OR HOPELESS: 0
SUM OF ALL RESPONSES TO PHQ QUESTIONS 1-9: 0

## 2020-11-12 ASSESSMENT — ENCOUNTER SYMPTOMS
GASTROINTESTINAL NEGATIVE: 1
RESPIRATORY NEGATIVE: 1
ALLERGIC/IMMUNOLOGIC NEGATIVE: 1
EYES NEGATIVE: 1

## 2020-11-12 NOTE — PROGRESS NOTES
SUBJECTIVE:  Patient ID: Mario Eastman is a 35 y.o. y.o. female     HPI   Pt is here to re/establish   Over all doing well  Has no concern today   History reviewed. No pertinent past medical history.    Past Surgical History:   Procedure Laterality Date    DILATION AND CURETTAGE OF UTERUS N/A 5/15/2020    DILATATION AND CURETTAGE SUCTION performed by Franco Savage MD at [facility]     Family History   Problem Relation Age of Onset    Cancer Neg Hx     Diabetes Neg Hx     Heart Disease Neg Hx     Stroke Neg Hx      Social History     Socioeconomic History    Marital status:      Spouse name: None    Number of children: None    Years of education: None    Highest education level: None   Occupational History    None   Social Needs    Financial resource strain: Not very hard    Food insecurity     Worry: Never true     Inability: Never true    Transportation needs     Medical: No     Non-medical: No   Tobacco Use    Smoking status: Never Smoker    Smokeless tobacco: Never Used   Substance and Sexual Activity    Alcohol use: No    Drug use: No    Sexual activity: Yes     Partners: Male   Lifestyle    Physical activity     Days per week: None     Minutes per session: None    Stress: None   Relationships    Social connections     Talks on phone: None     Gets together: None     Attends Restoration service: None     Active member of club or organization: None     Attends meetings of clubs or organizations: None     Relationship status: None    Intimate partner violence     Fear of current or ex partner: None     Emotionally abused: None     Physically abused: None     Forced sexual activity: None   Other Topics Concern    None   Social History Narrative    None     Current Outpatient Medications   Medication Sig Dispense Refill    Prenatal Vit-Fe Fumarate-FA (PRENATAL VITAMINS) 28-0.8 MG TABS Take by mouth      ferrous sulfate (IRON 325) 325 (65 Fe) MG tablet Take 1 tablet by mouth daily (with breakfast) (Patient not taking: Reported on 11/12/2020) 30 tablet 2    ibuprofen (ADVIL;MOTRIN) 800 MG tablet Take 1 tablet by mouth every 8 hours as needed for Pain (Patient not taking: Reported on 11/12/2020) 30 tablet 0     No current facility-administered medications for this visit. No Known Allergies    Review of Systems   Constitutional: Negative. HENT: Negative. Eyes: Negative. Respiratory: Negative. Cardiovascular: Negative. Gastrointestinal: Negative. Endocrine: Negative. Genitourinary: Negative. Musculoskeletal: Negative. Allergic/Immunologic: Negative. Neurological: Negative. Hematological: Negative. Psychiatric/Behavioral: Negative. All other systems reviewed and are negative. OBJECTIVE:  /70 (Site: Left Upper Arm, Position: Sitting, Cuff Size: Medium Adult)   Pulse 77   Temp 97.6 °F (36.4 °C) (Skin)   Resp 14   Ht 5' 4.3\" (1.633 m)   Wt 147 lb (66.7 kg)   LMP 11/05/2020 (Exact Date)   SpO2 99%   BMI 25.00 kg/m²     Physical Exam  Vitals signs reviewed. Constitutional:       General: She is not in acute distress. Appearance: She is well-developed. She is not diaphoretic. HENT:      Head: Normocephalic and atraumatic. Right Ear: External ear normal.      Left Ear: External ear normal.      Nose: Nose normal.      Mouth/Throat:      Pharynx: No oropharyngeal exudate. Eyes:      General: No scleral icterus. Right eye: No discharge. Left eye: No discharge. Conjunctiva/sclera: Conjunctivae normal.      Pupils: Pupils are equal, round, and reactive to light. Neck:      Musculoskeletal: Normal range of motion and neck supple. Thyroid: No thyromegaly. Vascular: No JVD. Trachea: No tracheal deviation. Cardiovascular:      Rate and Rhythm: Normal rate and regular rhythm. Heart sounds: Normal heart sounds. No murmur. No friction rub. No gallop.     Pulmonary:      Effort: Pulmonary effort is normal. No respiratory distress. Breath sounds: Normal breath sounds. No stridor. No wheezing or rales. Chest:      Chest wall: No tenderness. Abdominal:      General: Bowel sounds are normal. There is no distension. Palpations: Abdomen is soft. There is no mass. Tenderness: There is no abdominal tenderness. There is no guarding or rebound. Musculoskeletal: Normal range of motion. General: No tenderness. Lymphadenopathy:      Cervical: No cervical adenopathy. Skin:     General: Skin is warm and dry. Coloration: Skin is not pale. Findings: No erythema or rash. Neurological:      Mental Status: She is alert and oriented to person, place, and time. Cranial Nerves: No cranial nerve deficit. Motor: No abnormal muscle tone. Coordination: Coordination normal.      Deep Tendon Reflexes: Reflexes are normal and symmetric. Reflexes normal.   Psychiatric:         Behavior: Behavior normal.         Thought Content: Thought content normal.         Judgment: Judgment normal.         ASSESSMENT:   Diagnosis Orders   1. Annual physical exam  Basic Metabolic Panel    CBC    Hepatic Function Panel    TSH without Reflex    Lipid Panel   2. Need for influenza vaccination  INFLUENZA, QUADV, 6 MO AND OLDER, IM, PF, PREFILL SYR, 0.5ML (FLUARIX QUADV, PF)       PLAN:  See orders   Discussed healthier life style, exercise.

## 2020-11-12 NOTE — PROGRESS NOTES
Vaccine Information Sheet, \"Influenza - Inactivated\"  given to Markie Cruz, or parent/legal guardian of  Markie Cruz and verbalized understanding. Patient responses:    Have you ever had a reaction to a flu vaccine? No  Are you able to eat eggs without adverse effects? Yes  Do you have any current illness? No  Have you ever had Guillian [place] Syndrome? No    Immunizations Administered     Name Date Dose Route    Influenza, Quadv, IM, PF (6 mo and older Fluzone, Flulaval, Fluarix, and 3 yrs and older Afluria) 11/12/2020 0.5 mL Intramuscular    Site: Deltoid- Left    Lot: [id number]    NDC: 78086-248-62          Flu vaccine given per order. Please see immunization tab. Patient instructed to remain in clinic for 20 minutes after injection and was advised to report any adverse reaction to me immediately.

## 2021-04-05 ENCOUNTER — IMMUNIZATION (OUTPATIENT)
Dept: FAMILY MEDICINE CLINIC | Age: 31
End: 2021-04-05
Payer: COMMERCIAL

## 2021-04-07 PROCEDURE — 0001A COVID-19, PFIZER VACCINE 30MCG/0.3ML DOSE: CPT | Performed by: FAMILY MEDICINE

## 2021-04-07 PROCEDURE — 91300 COVID-19, PFIZER VACCINE 30MCG/0.3ML DOSE: CPT | Performed by: FAMILY MEDICINE

## 2021-04-26 ENCOUNTER — IMMUNIZATION (OUTPATIENT)
Dept: FAMILY MEDICINE CLINIC | Age: 31
End: 2021-04-26
Payer: COMMERCIAL

## 2021-04-26 PROCEDURE — 0002A COVID-19, PFIZER VACCINE 30MCG/0.3ML DOSE: CPT | Performed by: FAMILY MEDICINE

## 2021-04-26 PROCEDURE — 91300 COVID-19, PFIZER VACCINE 30MCG/0.3ML DOSE: CPT | Performed by: FAMILY MEDICINE

## 2021-10-26 ENCOUNTER — OFFICE VISIT (OUTPATIENT)
Dept: PRIMARY CARE CLINIC | Age: 31
End: 2021-10-26
Payer: COMMERCIAL

## 2021-10-26 VITALS
WEIGHT: 137 LBS | DIASTOLIC BLOOD PRESSURE: 70 MMHG | HEIGHT: 65 IN | OXYGEN SATURATION: 100 % | TEMPERATURE: 97.9 F | SYSTOLIC BLOOD PRESSURE: 110 MMHG | BODY MASS INDEX: 22.82 KG/M2 | HEART RATE: 71 BPM

## 2021-10-26 DIAGNOSIS — Z00.00 ANNUAL PHYSICAL EXAM: Primary | ICD-10-CM

## 2021-10-26 DIAGNOSIS — Z23 NEED FOR INFLUENZA VACCINATION: ICD-10-CM

## 2021-10-26 PROCEDURE — 90471 IMMUNIZATION ADMIN: CPT | Performed by: FAMILY MEDICINE

## 2021-10-26 PROCEDURE — 90674 CCIIV4 VAC NO PRSV 0.5 ML IM: CPT | Performed by: FAMILY MEDICINE

## 2021-10-26 PROCEDURE — 99395 PREV VISIT EST AGE 18-39: CPT | Performed by: FAMILY MEDICINE

## 2021-10-26 SDOH — SOCIAL STABILITY: SOCIAL NETWORK
DO YOU BELONG TO ANY CLUBS OR ORGANIZATIONS SUCH AS CHURCH GROUPS UNIONS, FRATERNAL OR ATHLETIC GROUPS, OR SCHOOL GROUPS?: NO

## 2021-10-26 SDOH — SOCIAL STABILITY: SOCIAL NETWORK
IN A TYPICAL WEEK, HOW MANY TIMES DO YOU TALK ON THE PHONE WITH FAMILY, FRIENDS, OR NEIGHBORS?: MORE THAN THREE TIMES A WEEK

## 2021-10-26 SDOH — ECONOMIC STABILITY: INCOME INSECURITY: HOW HARD IS IT FOR YOU TO PAY FOR THE VERY BASICS LIKE FOOD, HOUSING, MEDICAL CARE, AND HEATING?: NOT HARD AT ALL

## 2021-10-26 SDOH — ECONOMIC STABILITY: INCOME INSECURITY: IN THE LAST 12 MONTHS, WAS THERE A TIME WHEN YOU WERE NOT ABLE TO PAY THE MORTGAGE OR RENT ON TIME?: NO

## 2021-10-26 SDOH — ECONOMIC STABILITY: FOOD INSECURITY: WITHIN THE PAST 12 MONTHS, YOU WORRIED THAT YOUR FOOD WOULD RUN OUT BEFORE YOU GOT MONEY TO BUY MORE.: NEVER TRUE

## 2021-10-26 SDOH — HEALTH STABILITY: PHYSICAL HEALTH: ON AVERAGE, HOW MANY DAYS PER WEEK DO YOU ENGAGE IN MODERATE TO STRENUOUS EXERCISE (LIKE A BRISK WALK)?: 5 DAYS

## 2021-10-26 SDOH — SOCIAL STABILITY: SOCIAL NETWORK: HOW OFTEN DO YOU ATTENT MEETINGS OF THE CLUB OR ORGANIZATION YOU BELONG TO?: NEVER

## 2021-10-26 SDOH — ECONOMIC STABILITY: HOUSING INSECURITY
IN THE LAST 12 MONTHS, WAS THERE A TIME WHEN YOU DID NOT HAVE A STEADY PLACE TO SLEEP OR SLEPT IN A SHELTER (INCLUDING NOW)?: NO

## 2021-10-26 SDOH — ECONOMIC STABILITY: HOUSING INSECURITY: IN THE LAST 12 MONTHS, HOW MANY PLACES HAVE YOU LIVED?: 1

## 2021-10-26 SDOH — SOCIAL STABILITY: SOCIAL NETWORK: ARE YOU MARRIED, WIDOWED, DIVORCED, SEPARATED, NEVER MARRIED, OR LIVING WITH A PARTNER?: MARRIED

## 2021-10-26 SDOH — ECONOMIC STABILITY: FOOD INSECURITY: WITHIN THE PAST 12 MONTHS, THE FOOD YOU BOUGHT JUST DIDN'T LAST AND YOU DIDN'T HAVE MONEY TO GET MORE.: NEVER TRUE

## 2021-10-26 SDOH — HEALTH STABILITY: MENTAL HEALTH: HOW OFTEN DO YOU HAVE A DRINK CONTAINING ALCOHOL?: NEVER

## 2021-10-26 SDOH — SOCIAL STABILITY: SOCIAL NETWORK: HOW OFTEN DO YOU ATTEND CHURCH OR RELIGIOUS SERVICES?: MORE THAN 4 TIMES PER YEAR

## 2021-10-26 SDOH — HEALTH STABILITY: PHYSICAL HEALTH: ON AVERAGE, HOW MANY MINUTES DO YOU ENGAGE IN EXERCISE AT THIS LEVEL?: 60 MIN

## 2021-10-26 SDOH — SOCIAL STABILITY: SOCIAL NETWORK: HOW OFTEN DO YOU GET TOGETHER WITH FRIENDS OR RELATIVES?: ONCE A WEEK

## 2021-10-26 SDOH — HEALTH STABILITY: MENTAL HEALTH
STRESS IS WHEN SOMEONE FEELS TENSE, NERVOUS, ANXIOUS, OR CAN'T SLEEP AT NIGHT BECAUSE THEIR MIND IS TROUBLED. HOW STRESSED ARE YOU?: NOT AT ALL

## 2021-10-26 ASSESSMENT — PATIENT HEALTH QUESTIONNAIRE - PHQ9
SUM OF ALL RESPONSES TO PHQ9 QUESTIONS 1 & 2: 0
SUM OF ALL RESPONSES TO PHQ QUESTIONS 1-9: 0
1. LITTLE INTEREST OR PLEASURE IN DOING THINGS: 0
SUM OF ALL RESPONSES TO PHQ QUESTIONS 1-9: 0
SUM OF ALL RESPONSES TO PHQ QUESTIONS 1-9: 0
2. FEELING DOWN, DEPRESSED OR HOPELESS: 0

## 2021-10-26 NOTE — PROGRESS NOTES
Vaccine Information Sheet, \"Influenza - Inactivated\"  given to Juanita Eason, or parent/legal guardian of  Juanita Eason and verbalized understanding. Patient responses:    Have you ever had a reaction to a flu vaccine? No  Are you able to eat eggs without adverse effects? Yes  Do you have any current illness? No  Have you ever had Guillian [place] Syndrome? No    Immunizations Administered     Name Date Dose Route    Influenza, MDCK Quadv, IM, PF (Flucelvax 2 yrs and older) 10/26/2021 0.5 mL Intramuscular    Site: Deltoid- Left    Lot: [id number]    NDC: 42759-155-03          Flu vaccine given per order. Please see immunization tab. Patient instructed to remain in clinic for 20 minutes after injection and was advised to report any adverse reaction to me immediately.

## 2021-10-26 NOTE — PROGRESS NOTES
Subjective:      Demar Alexis is a 28 y.o. female who presents for an annual exam. The patient has no complaints today. The patient is sexually active. Wears seatbelts: yes last pap: was normal  Regular exercise: yes  Ever been transfused or tattooed?: no  The patient reports that domestic violence in her life is absent. Menstrual History:  OB History        2    Para   2    Term   2       0    AB   0    Living   2       SAB   0    TAB   0    Ectopic   0    Molar   0    Multiple   0    Live Births   2               Menarche age: 14  Patient's last menstrual period was 10/15/2021 (exact date). No past medical history on file.   Patient Active Problem List    Diagnosis Date Noted    Term pregnancy delivered 05/15/2020     Past Surgical History:   Procedure Laterality Date    DILATION AND CURETTAGE OF UTERUS N/A 5/15/2020    DILATATION AND CURETTAGE SUCTION performed by Otto Mendez MD at [facility]     Family History   Problem Relation Age of Onset    Cancer Neg Hx     Diabetes Neg Hx     Heart Disease Neg Hx     Stroke Neg Hx      Social History     Socioeconomic History    Marital status:      Spouse name: None    Number of children: None    Years of education: None    Highest education level: None   Occupational History    None   Tobacco Use    Smoking status: Never Smoker    Smokeless tobacco: Never Used   Vaping Use    Vaping Use: Never used   Substance and Sexual Activity    Alcohol use: No    Drug use: No    Sexual activity: Yes     Partners: Male   Other Topics Concern    None   Social History Narrative    None     Social Determinants of Health     Financial Resource Strain: Low Risk     Difficulty of Paying Living Expenses: Not hard at all   Food Insecurity: No Food Insecurity    Worried About Running Out of Food in the Last Year: Never true    Brian of Food in the Last Year: Never true   Transportation Needs: No Transportation Needs    Lack of Transportation (Medical): No    Lack of Transportation (Non-Medical): No   Physical Activity: Sufficiently Active    Days of Exercise per Week: 5 days    Minutes of Exercise per Session: 60 min   Stress: No Stress Concern Present    Feeling of Stress : Not at all   Social Connections: Moderately Integrated    Frequency of Communication with Friends and Family: More than three times a week    Frequency of Social Gatherings with Friends and Family: Once a week    Attends Hoahaoism Services: More than 4 times per year    Active Member of [address] [place] [address] [employer] or Organizations: No    Attends Club or Organization Meetings: Never    Marital Status:    Intimate Partner Violence:     Fear of Current or Ex-Partner:     Emotionally Abused:     Physically Abused:     Sexually Abused:      Current Outpatient Medications   Medication Sig Dispense Refill    ferrous sulfate (IRON 325) 325 (65 Fe) MG tablet Take 1 tablet by mouth daily (with breakfast) (Patient not taking: Reported on 11/12/2020) 30 tablet 2    ibuprofen (ADVIL;MOTRIN) 800 MG tablet Take 1 tablet by mouth every 8 hours as needed for Pain (Patient not taking: Reported on 11/12/2020) 30 tablet 0    Prenatal Vit-Fe Fumarate-FA (PRENATAL VITAMINS) 28-0.8 MG TABS Take by mouth (Patient not taking: Reported on 10/26/2021)       No current facility-administered medications for this visit.      Current Outpatient Medications on File Prior to Visit   Medication Sig Dispense Refill    ferrous sulfate (IRON 325) 325 (65 Fe) MG tablet Take 1 tablet by mouth daily (with breakfast) (Patient not taking: Reported on 11/12/2020) 30 tablet 2    ibuprofen (ADVIL;MOTRIN) 800 MG tablet Take 1 tablet by mouth every 8 hours as needed for Pain (Patient not taking: Reported on 11/12/2020) 30 tablet 0    Prenatal Vit-Fe Fumarate-FA (PRENATAL VITAMINS) 28-0.8 MG TABS Take by mouth (Patient not taking: Reported on 10/26/2021)       No current facility-administered medications on file prior to visit. No Known Allergies    Review of Systems  Pertinent items are noted in HPI. Objective:      /70 (Site: Right Upper Arm, Position: Sitting, Cuff Size: Small Adult)   Pulse 71   Temp 97.9 °F (36.6 °C) (Oral)   Ht 5' 5\" (1.651 m)   Wt 137 lb (62.1 kg)   LMP 10/15/2021 (Exact Date)   SpO2 100%   BMI 22.80 kg/m²     General Appearance:    Alert, cooperative, no distress, appears stated age   Head:    Normocephalic, without obvious abnormality, atraumatic   Eyes:    PERRL, conjunctiva/corneas clear, EOM's intact, fundi     benign, both eyes   Ears:    Normal TM's and external ear canals, both ears   Nose:   Nares normal, septum midline, mucosa normal, no drainage    or sinus tenderness   Throat:   Lips, mucosa, and tongue normal; teeth and gums normal   Neck:   Supple, symmetrical, trachea midline, no adenopathy;     thyroid:  no enlargement/tenderness/nodules; no carotid    bruit or JVD   Back:     Symmetric, no curvature, ROM normal, no CVA tenderness   Lungs:     Clear to auscultation bilaterally, respirations unlabored   Chest Wall:    No tenderness or deformity    Heart:    Regular rate and rhythm, S1 and S2 normal, no murmur, rub   or gallop   Breast Exam:    No tenderness, masses, or nipple abnormality   Abdomen:     Soft, non-tender, bowel sounds active all four quadrants,     no masses, no organomegaly   Genitalia:    Normal female without lesion, discharge or tenderness   Rectal:    Normal tone ;guaiac negative stool   Extremities:   Extremities normal, atraumatic, no cyanosis or edema   Pulses:   2+ and symmetric all extremities   Skin:   Skin color, texture, turgor normal, no rashes or lesions   Lymph nodes:   Cervical, supraclavicular, and axillary nodes normal   Neurologic:   CNII-XII intact, normal strength, sensation and reflexes     throughout   . Assessment:      Healthy female exam.      Plan:       All questions answered  Await Pap smear results. Patient has some questions about oral contraceptive versus other way of contraception  Printed information she will read through them she is interested she will let me know

## 2021-10-26 NOTE — PATIENT INSTRUCTIONS
Patient Education        Combination Birth Control Pills: Care Instructions  Overview     Combination birth control pills are used to prevent pregnancy. They give you a regular dose of the hormones estrogen and progestin. You take a pill every day to prevent pregnancy. Birth control pills come in packs. The most common type has 3 weeks of hormone pills. Some packs have sugar pills (they do not contain any hormones) for the fourth week. During that fourth no-hormone week, you have your period. After the fourth week (28 days), you start a new pack. Some birth control pills are packaged in different ways. For example, some have hormone pills for the fourth week instead of sugar pills. This is called continuous use. Taking hormones for the entire month causes you to not have periods or to have fewer periods. Others are packaged so that you have a period every 3 months. Your doctor will tell you what type of pills you have. Follow-up care is a key part of your treatment and safety. Be sure to make and go to all appointments, and call your doctor if you are having problems. It's also a good idea to know your test results and keep a list of the medicines you take. How can you care for yourself at home? How do you take the pill? · Follow your doctor's instructions about when to start taking your pills. Use backup birth control, such as a condom, or don't have intercourse for 7 days after you start your pills. · Take your pills every day, at about the same time of day. To help yourself do this, try to take them when you do something else every day, such as brushing your teeth. · You can use the pill continuously and skip your period. When you get to the week that you take hormone-free pills, skip those pills and instead start right away on your next pill pack. Continue to take your pill every day. Talk to your doctor if you have any questions. What if you forget to take a pill?   Always read the label for specific your doctor now or seek immediate medical care if:    · You have severe belly pain.     · You have signs of a blood clot, such as:  ? Pain in your calf, back of the knee, thigh, or groin. ? Redness and swelling in your leg or groin.     · You have blurred vision or other problems seeing.     · You have a severe headache.     · You have severe trouble breathing. Watch closely for changes in your health, and be sure to contact your doctor if:    · You think you might be pregnant.     · You think you may be depressed.     · You think you may have been exposed to or have a sexually transmitted infection. Where can you learn more? Go to https://Chronos Therapeuticspepiceweb.healthMazu [email or web address] org and sign in to your Spritz account. Enter D055 in the Express Fit box to learn more about \"Combination Birth Control Pills: Care Instructions. \"     If you do not have an account, please click on the \"Sign Up Now\" link. Current as of: February 11, 2021               Content Version: 13.0  © 2006-2021 Ambassador. Care instructions adapted under license by [facility] ([facility]). If you have questions about a medical condition or this instruction, always ask your healthcare professional. [place] [address] any warranty or liability for your use of this information. Patient Education        Learning About Birth Control: Combination Pills  What are combination pills? Combination pills are used to prevent pregnancy. Most people call them \"the pill. \"  Combination pills release a regular dose of two hormones, estrogen and progestin. They prevent pregnancy in a few ways. They thicken the mucus in the cervix. This makes it hard for sperm to travel into the uterus. And they thin the lining of the uterus. This makes it harder for a fertilized egg to attach to the uterus. The hormones also can stop the ovaries from releasing an egg each month (ovulation).   You have to take a pill every day to prevent pregnancy. The packages for these pills are different. The most common one has 3 weeks of hormone pills and 1 week of sugar pills. The sugar pills don't contain any hormones. You have your period on that week. But other packs have no sugar pills. You take hormone pills for the whole month instead. This is called continuous use. With this method, you will not get your period as often. Or you may not get it at all. How well do they work? In the first year of use:  · When combination pills are taken exactly as directed, fewer than 1 person out of 100 has an unplanned pregnancy. · When pills are not taken exactly as directed, such as forgetting to take them sometimes, 9 people out of 100 have an unplanned pregnancy. Be sure to tell your doctor about any health problems you have or medicines you take. Your doctor can help you choose the birth control method that is right for you. What are the advantages of combination pills? · These pills work better than barrier methods. Barrier methods include condoms and diaphragms. · They may reduce acne and heavy bleeding. They may also reduce cramping and other symptoms of PMS (premenstrual syndrome). · The pills let you control your periods. You can schedule your periods to be every month or every few months. Or you can choose not to have them at all. You may take pills that continue to give you hormones during the whole month (continuous use). This protects against pregnancy and is also a safe way to avoid having your period. This may help if you have painful periods. · You don't have to interrupt sex to use the pills. What are the disadvantages of combination pills? · You have to take a pill at the same time every day to prevent pregnancy. · Combination pills don't protect against sexually transmitted infections (STIs), such as herpes or HIV/AIDS. If you aren't sure if your sex partner(s) might have an STI, use a condom to help protect against disease.   · They may cause changes in your period. You may have little bleeding, skipped periods, or spotting. If you're using pills that give you hormones for the whole month, your periods will stop. But you may still have breakthrough bleeding. This usually isn't harmful and may decrease over time. · They may cause mood changes, less interest in sex, or weight gain. · Combination pills contain estrogen. They may not be right for you if you have certain health problems. Where can you learn more? Go to https://CampusTap.Master Route. org and sign in to your Socure account. Enter L579 in the [facility] box to learn more about \"Learning About Birth Control: Combination Pills. \"     If you do not have an account, please click on the \"Sign Up Now\" link. Current as of: February 11, 2021               Content Version: 13.0  © 2006-2021 Salsa Labs. Care instructions adapted under license by [facility] ([facility]). If you have questions about a medical condition or this instruction, always ask your healthcare professional. [place] [address] any warranty or liability for your use of this information. Patient Education        Learning About Birth Control: Intrauterine Device (IUD)  What is an intrauterine device (IUD)? The intrauterine device (IUD) is used to prevent pregnancy. It's a small, plastic, T-shaped device. Your doctor places the IUD in your uterus. If you and your doctor discuss it before you give birth, this can be done right after you have your baby. You have a choice between a hormonal IUD and a copper IUD. The hormonal IUD can prevent pregnancy for 3 to 6 years, depending on which IUD is used. But your doctor may talk to you about leaving it in for longer. When you have it, you don't have to do anything else to prevent pregnancy. The copper IUD can prevent pregnancy for 10 years. But your doctor may talk to you about leaving it in for longer.  When you have it, you don't have to do anything else to prevent pregnancy. A string tied to the end of the IUD hangs down through the opening of the uterus (called the cervix) into the vagina. You can check that the IUD is in place by feeling for the string. The IUD usually stays in the uterus until your doctor removes it. How well does an IUD for birth control work? In the first year of use:  · When the hormonal IUD is used exactly as directed, fewer than 1 out of 100 women have an unplanned pregnancy. · When the copper IUD is used exactly as directed, fewer than 1 out of 100 women have an unplanned pregnancy. Be sure to tell your doctor about any health problems you have or medicines you take. Your doctor can help you choose the birth control method that's right for you. What are the advantages of an IUD? · An IUD is one of the most effective methods of birth control. · It prevents pregnancy for 3 to 10 years, depending on the type. You don't have to worry about birth control during this time. · It's safe to use while breastfeeding. · IUDs don't contain estrogen. So you can use an IUD if you don't want to take estrogen or can't take estrogen because you have certain health problems or concerns. · An IUD is convenient. It is always providing birth control. You don't need to remember to take a pill or get a shot. You don't have to interrupt sex to protect against pregnancy. · A hormonal IUD may reduce heavy bleeding and cramping. What are the disadvantages of an IUD? · An IUD doesn't protect against sexually transmitted infections (STIs), such as herpes or HIV/AIDS. If you aren't sure if your sex partner might have an STI, use a condom to protect against disease. · A copper IUD may cause periods with more bleeding and cramping. · You have to see a doctor to have an IUD inserted and removed. Where can you learn more? Go to https://lamont.health-partners. org and sign in to your [email or web address] account.  Enter W625 in the Search two main types of IUDs--the copper IUD and the hormonal IUD. The hormonal IUD releases progestin. IUDs prevent pregnancy for 3 to 10 years, depending on the type. · Hormonal methods are very good at preventing pregnancy. Combination birth control pills (\"the pill\"), skin patches, and vaginal rings release the hormones estrogen and progestin. Shots, mini-pills, hormonal IUDs, and implants release progestin only. · Barrier methods generally do not prevent pregnancy as well as IUDs or hormonal methods do. Barrier methods include condoms, diaphragms, cervical caps, and sponges. You must use barrier methods every time you have sex. · Natural family planning can work if you and your partner are very careful and you have a regular ovulation cycle. You will need to keep good records so you know when you are most likely to become pregnant (you are fertile). And during times you are fertile, you will need to not have sex or to use a barrier method. Natural family planning is also known as fertility awareness and the rhythm method. · Permanent birth control (sterilization) gives you lasting protection against pregnancy. A man can have a vasectomy, or a woman can have her tubes tied (tubal ligation). But this is only a good choice if you are sure that you don't want any (or any more) children. · Emergency contraception is a backup method to prevent pregnancy if you didn't use birth control or a condom breaks. The most effective emergency contraception is prescribed by a doctor. This includes the copper IUD (inserted by a doctor) or a prescription pill. You can also get emergency contraceptive pills without a prescription at most drugstores. How do you choose the best method? The best method of birth control is one that protects you every time you have sex. This usually depends on how well you use it. To find a method that will work best for you, think about:  · How well it works.  Think about how important it is to you to avoid pregnancy. Then look at how well each method works. For example, if you plan to have a child soon anyway, you may not need a very reliable method. If you don't want children but feel it is wrong to end a pregnancy, choose a type of birth control that works very well. · How much effort it takes. For example, birth control pills may not be a good choice if you often forget to take medicine. Or, if you are not sure you will stop and use a barrier method each time you have sex, pick another method. · How much the method costs. For example, condoms are cheap or free in some clinics. Some insurance companies cover the cost of prescription birth control. But cost can sometimes be misleading. An IUD costs a lot up front. But it works for years, making it low-cost over time. · Whether it protects you from infection. Latex condoms can help protect you from sexually transmitted infections (STIs), such as herpes or HIV/AIDS. But they are not the best way to prevent pregnancy. To avoid both STIs and pregnancy, use condoms along with another type of birth control. · Whether you've had a problem with one kind of birth control. Finding the best method of birth control may involve trying something different. Also, you may need to change a method that once worked well for you. · Whether you want children. If you are positive you don't want children, a lasting method of birth control might be best.  · Your health issues. Some birth control methods may not be safe for you, depending on your health issues. For example, women who smoke, are breastfeeding, or have had breast cancer may not be able to use certain methods. How can you get birth control? · You can buy:  ? Condoms, sponges, and spermicides without a prescription in drugstores, online, and in many grocery stores. ? Some forms of emergency contraception without a prescription at most drugstores.   · You need to see a doctor or visit a family planning clinic to:  ? Get a prescription for birth control pills and other methods that use hormones. ? Have an implant or IUD inserted, including the type of IUD used for emergency contraception. ? Get a hormone shot. ? Get a prescription for a diaphragm or cervical cap. ? Get a prescription for certain kinds of emergency contraception. Where can you learn more? Go to https://chpeemmanuelewleno.healthLED Roadway Lightingpartners. org and sign in to your Pivotshare account. Enter J874 in the SoZo Global box to learn more about \"Learning About Birth Control. \"     If you do not have an account, please click on the \"Sign Up Now\" link. Current as of: June 16, 2021               Content Version: 13.0  © 2006-2021 SoundFit. Care instructions adapted under license by [facility] [address] If you have questions about a medical condition or this instruction, always ask your healthcare professional. [place] [address] any warranty or liability for your use of this information. Patient Education        Cervical Cap for Birth Control: Care Instructions  Your Care Instructions     The cervical cap is used to prevent pregnancy. It is made of silicone. A cap is called a barrier method because it keeps the sperm and eggs apart. The cap fits inside your vagina and covers the cervix, which is the opening of the uterus. You use the cap each time you have intercourse. The cap will last for 1 year. Your doctor will fit you for the cap and give you a prescription for it. Your doctor may ask you to come back to make sure you know how to insert the cap and use it correctly. Follow-up care is a key part of your treatment and safety. Be sure to make and go to all appointments, and call your doctor if you are having problems. It's also a good idea to know your test results and keep a list of the medicines you take. How can you care for yourself at home? How do you use the cervical cap?    · Insert the cap each time you have intercourse. Read the instructions for how to insert the cap. It must be done correctly to protect against pregnancy. In general:  ? Apply spermicide (contraceptive jelly) to the cap according to the directions. ? Insert the cap all the way into your vagina. Make sure it is covering your cervix. ? Remove the cap by pulling it out with the removal strap. · You can insert the cap up to 6 hours before having intercourse. Leave the cap in place for at least 6 hours after intercourse. Do not leave it in for more than 48 hours. · If you have sex more than one time when the cap is in place, put a small amount of spermicide on the tip of your finger. Insert your finger into your vagina and check the position of the cap. Don't remove the cap. · Avoid using any petroleum-based vaginal creams, oils, or ointments, which can damage the silicone. But water-based personal lubricants, such as Astroglide and K-Y Jelly, are safe to use. What if you think the cap is not placed correctly or not protecting you from pregnancy? · Always read the instructions. · Call your doctor and use backup birth control, such as a condom, or don't have intercourse until you know the cap is working. · If you had intercourse, you can use emergency contraception to help prevent pregnancy. The most effective emergency contraception is prescribed by a doctor. This includes the copper IUD (inserted by a doctor) or a prescription pill. You can also get emergency contraceptive pills without a prescription at [facility]. What else do you need to know? · Spermicide used with the cap may cause an allergic reaction. It can cause itching or sores in the vagina or on the penis. · Don't use the cap during your period. Use another method, such as a condom, or don't have intercourse. · Check the cap often. Get a new cap if you see holes, tears, or discoloration. · Wash the cap with warm water and hand soap after removing it.  Make sure it's dry before you store it in its container. Do not use talcum or baby powder on the cap. These products may break down the silicone or irritate the vagina. · If you gain or lose a lot of weight, have abdominal surgery, or have a baby, talk to your doctor to see if you need a different size. · The cap doesn't protect against sexually transmitted infections (STIs), such as herpes or HIV/AIDS. If you're not sure whether your sex partner might have an STI, use a condom to protect against disease. Using a condom with the cap also protects better against pregnancy. When should you call for help? Watch closely for changes in your health, and be sure to contact your doctor if you have any problems. Where can you learn more? Go to https://curated.bypeOlapiceb.Algenol Biofuel. org and sign in to your Kingsoft account. Enter W101 in the Localsensor box to learn more about \"Cervical Cap for Birth Control: Care Instructions. \"     If you do not have an account, please click on the \"Sign Up Now\" link. Current as of: June 16, 2021               Content Version: 13.0  © 3341-3871 CompareNetworks. Care instructions adapted under license by [facility] ([facility]). If you have questions about a medical condition or this instruction, always ask your healthcare professional. [address] any warranty or liability for your use of this information. Patient Education        Patch for Birth Control: Care Instructions  Overview     The patch is used to prevent pregnancy. It looks like a bandage. It gives you a regular dose of the hormones estrogen and progestin. The patch comes in packs of three. You change the patch once a week for 3 weeks and then go without a patch for 1 week. During this week, you have your period. One pack provides birth control for 1 month. You may use the patch continuously, without stopping for a week each month. With this method, you won't have your period.   Follow-up care is a key part of your treatment and safety. Be sure to make and go to all appointments, and call your doctor if you are having problems. It's also a good idea to know your test results and keep a list of the medicines you take. How can you care for yourself at home? How do you use the patch? · Talk to your doctor about the best day to start using the patch. Ask if you will need to use backup birth control, such as a condom, or avoid intercourse for a period of time. · Always follow the directions that came with the patch. In general:  ? Use the patch 3 out of 4 weeks. Put on a new patch every week on the same day of the week. The 4th week, don't wear a patch. You'll have your period. If you're using the patch continuously, put on a new patch every week, without skipping a week each month. Do this on the same day every week. ? Put the patch on your lower belly, buttocks, or upper body. Don't put it on your breasts. ? Don't put lotions, oils, powders, or makeup on the area where you're going to put the patch. They could keep the patch from sticking. ? Try not to place the patch under bra straps. What if you forget a patch or it falls off? Always read the label for specific instructions, or call your doctor. Here are some basic guidelines:  · In the first week, if you forget a patch or are late, put on a patch right away and:  ? Use backup birth control, such as a condom, or don't have intercourse for 7 days. ? If you had intercourse, you can use emergency contraception as a way to prevent pregnancy. The most effective emergency contraception is the copper IUD (inserted by a doctor). You can also get emergency contraceptive pills without a prescription [facility]. · In the second and third weeks:  ? If you are less than 48 hours late, put on a new patch right away. You don't need backup birth control or emergency contraception. ? If you are late by 48 hours or more, put on a new patch right away.  Use backup birth control or don't have intercourse for 7 days. If you had intercourse, you can use emergency contraception. · If a patch doesn't stick well or falls off:  ? For less than 48 hours, put it back on. If it doesn't stick well, use a new patch. ? For 48 hours or more, put on a new patch right away. Use backup birth control or don't have intercourse for 7 days. If you had intercourse, ask your doctor about using emergency contraception. What else do you need to know? · The patch can have side effects. ? You may have very light or skipped periods. ? You may have bleeding between periods (spotting). This usually decreases after 3 to 4 months. If you're using the patch continuously, you won't have periods. But you may still have breakthrough bleeding. Talk to your doctor if you have problems with breakthrough bleeding. Even if you have this bleeding, the patch should still work well.  ? You may have mood changes, less interest in sex, or weight gain. · Avoid exposing the patch to heat. This includes direct sunlight and using tanning beds, heating pads, electric blankets, hot tubs, or saunas. Direct sunlight or high heat changes how the patch releases hormones. This makes the chance of getting pregnant more likely. · To help remember to put a new patch on:  ? [place] on a calendar the days you need to change the patch. ? Set up a reminder using your computer or other similar device. · Check with your doctor before you use any other medicines, including over-the-counter medicines, vitamins, herbal products, and supplements. Birth control hormones may not work as well to prevent pregnancy when combined with other medicines. · The patch doesn't protect against sexually transmitted infections (STIs), such as herpes or HIV/AIDS. If you're not sure whether your sex partner(s) might have an STI, use a condom to help protect against disease. When should you call for help?    Call your doctor now or seek immediate medical care if:    · You have severe belly pain.     · You have signs of a blood clot, such as:  ? Pain in your calf, back of the knee, thigh, or groin. ? Redness and swelling in your leg or groin.     · You have blurred vision or other problems seeing.     · You have a severe headache.     · You have severe trouble breathing. Watch closely for changes in your health, and be sure to contact your doctor if:    · You think you might be pregnant.     · You think you may be depressed.     · You think you may have been exposed to or have a sexually transmitted infection. Where can you learn more? Go to [email or web address] and sign in to your [employer] account. Enter [id number] in the [employer] box to learn more about \"Patch for Birth Control: Care Instructions. \"     If you do not have an account, please click on the \"Sign Up Now\" link. Current as of: February 11, 2021               Content Version: 13.0  © 2006-2021 [employer]. Care instructions adapted under license by [facility] ([facility]). If you have questions about a medical condition or this instruction, always ask your healthcare professional. [place] [address] any warranty or liability for your use of this information. Patient Education        Implant for Birth Control: Care Instructions  Your Care Instructions     The implant is used to prevent pregnancy. It's a thin geovani about the size of a matchstick that is inserted under the skin (subdermal) on the inside of your arm. The implant prevents pregnancy for 3 years. After it is put in, you don't have to do anything else to prevent pregnancy. Follow-up care is a key part of your treatment and safety. Be sure to make and go to all appointments, and call your doctor if you are having problems. It's also a good idea to know your test results and keep a list of the medicines you take. How can you care for yourself at home?   How do you use the subdermal implant? · The implant is put in by your doctor or another trained health professional. It only takes a few minutes. This can also be done right after you give birth. Your doctor will remove the implant when it needs to be taken out. · An adhesive bandage and a tight (pressure) bandage will be placed over the site. This helps reduce swelling and bruising. · Ask your doctor if you need to use backup birth control, such as a condom, for a week after insertion. Whether you need to do this depends on where you are in your cycle. How can you care for the insertion site? · Remove the pressure bandage after 24 hours. Keep the area dry. · Keep an adhesive bandage on the site for 3 to 5 days after the procedure. · If you have pain, use an ice pack or take an over-the-counter pain medicine. Some soreness or bruising is normal.  What else do you need to know? · It's safe to use while breastfeeding. · The implant has side effects. ? You may have changes in your period. Your period may stop. You may also have spotting or bleeding between periods. ? You may have mood changes, less interest in sex, or weight gain. · The implant can stay in place for up to 3 years to prevent pregnancy. But your doctor may talk to you about leaving it in for longer. ? If you don't replace the implant and don't use another form of birth control, you could get pregnant. ? If you have the implant removed, you'll have to find another method of birth control. If you don't, you may get pregnant. ? Even if you are planning to get pregnant, you have to have the implant removed. · Check with your doctor before you use any other medicines. This includes over-the-counter medicines, vitamins, herbal products, and supplements. Birth control hormones may not work as well to prevent pregnancy when combined with other medicines. · The implant doesn't protect against sexually transmitted infections (STIs), such as herpes or HIV/AIDS.  If you're not sure if your sex partner might have an STI, use a condom to protect against infection. When should you call for help? Call 911  anytime you think you may need emergency care. For example, call if:    · You have shortness of breath.     · You have chest pain.     · You passed out (lost consciousness).     · You cough up blood. Call your doctor now or seek immediate medical care if:    · You have severe pain or numbness and tingling in the arm where the implant was inserted.     · You have increased pain, swelling, warmth, or redness at the insertion site.     · You have signs of a blood clot in your leg (called a deep vein thrombosis), such as:  ? Pain in your calf, back of the knee, thigh, or groin. ? Redness and swelling in your leg. Watch closely for changes in your health, and be sure to contact your doctor if:    · You can't feel your implant.     · You think your implant might be bent or broken in your arm.     · You think you might be pregnant.     · You have any problems with your birth control method. Where can you learn more? Go to [email or web address] and sign in to your [employer] account. Enter E115 in the [facility] box to learn more about \"Implant for Birth Control: Care Instructions. \"     If you do not have an account, please click on the \"Sign Up Now\" link. Current as of: June 16, 2021               Content Version: 13.0  © 9790-7060 Healthwise, [facility]. Care instructions adapted under license by [facility] ([facility]). If you have questions about a medical condition or this instruction, always ask your healthcare professional. [place] [address] any warranty or liability for your use of this information.

## 2021-10-29 DIAGNOSIS — Z00.00 ANNUAL PHYSICAL EXAM: ICD-10-CM

## 2021-10-29 LAB
ALBUMIN SERPL-MCNC: 4.3 G/DL (ref 3.4–5)
ALP BLD-CCNC: 52 U/L (ref 40–129)
ALT SERPL-CCNC: 11 U/L (ref 10–40)
ANION GAP SERPL CALCULATED.3IONS-SCNC: 12 MMOL/L (ref 3–16)
AST SERPL-CCNC: 14 U/L (ref 15–37)
BILIRUB SERPL-MCNC: 0.6 MG/DL (ref 0–1)
BILIRUBIN DIRECT: <0.2 MG/DL (ref 0–0.3)
BILIRUBIN, INDIRECT: ABNORMAL MG/DL (ref 0–1)
BUN BLDV-MCNC: 9 MG/DL (ref 7–20)
CALCIUM SERPL-MCNC: 9.6 MG/DL (ref 8.3–10.6)
CHLORIDE BLD-SCNC: 100 MMOL/L (ref 99–110)
CHOLESTEROL, TOTAL: 169 MG/DL (ref 0–199)
CO2: 27 MMOL/L (ref 21–32)
CREAT SERPL-MCNC: 0.5 MG/DL (ref 0.6–1.1)
GFR AFRICAN AMERICAN: >60
GFR NON-AFRICAN AMERICAN: >60
GLUCOSE BLD-MCNC: 90 MG/DL (ref 70–99)
HCT VFR BLD CALC: 35.6 % (ref 36–48)
HDLC SERPL-MCNC: 51 MG/DL (ref 40–60)
HEMOGLOBIN: 12 G/DL (ref 12–16)
LDL CHOLESTEROL CALCULATED: 102 MG/DL
MCH RBC QN AUTO: 30.2 PG (ref 26–34)
MCHC RBC AUTO-ENTMCNC: 33.7 G/DL (ref 31–36)
MCV RBC AUTO: 89.6 FL (ref 80–100)
PDW BLD-RTO: 12.9 % (ref 12.4–15.4)
PLATELET # BLD: 182 K/UL (ref 135–450)
PMV BLD AUTO: 10.8 FL (ref 5–10.5)
POTASSIUM SERPL-SCNC: 3.9 MMOL/L (ref 3.5–5.1)
RBC # BLD: 3.97 M/UL (ref 4–5.2)
SODIUM BLD-SCNC: 139 MMOL/L (ref 136–145)
TOTAL PROTEIN: 7.4 G/DL (ref 6.4–8.2)
TRIGL SERPL-MCNC: 78 MG/DL (ref 0–150)
TSH SERPL DL<=0.05 MIU/L-ACNC: 8.35 UIU/ML (ref 0.27–4.2)
VLDLC SERPL CALC-MCNC: 16 MG/DL
WBC # BLD: 5.6 K/UL (ref 4–11)

## 2022-11-28 ENCOUNTER — OFFICE VISIT (OUTPATIENT)
Dept: PRIMARY CARE CLINIC | Age: 32
End: 2022-11-28
Payer: COMMERCIAL

## 2022-11-28 VITALS
TEMPERATURE: 96.9 F | HEART RATE: 97 BPM | WEIGHT: 137.4 LBS | BODY MASS INDEX: 23.46 KG/M2 | HEIGHT: 64 IN | DIASTOLIC BLOOD PRESSURE: 86 MMHG | OXYGEN SATURATION: 98 % | SYSTOLIC BLOOD PRESSURE: 104 MMHG

## 2022-11-28 DIAGNOSIS — Z00.00 ENCOUNTER FOR WELL ADULT EXAM WITHOUT ABNORMAL FINDINGS: Primary | ICD-10-CM

## 2022-11-28 DIAGNOSIS — Z00.00 ENCOUNTER FOR WELL ADULT EXAM WITHOUT ABNORMAL FINDINGS: ICD-10-CM

## 2022-11-28 LAB
ALBUMIN SERPL-MCNC: 4.5 G/DL (ref 3.4–5)
ALP BLD-CCNC: 53 U/L (ref 40–129)
ALT SERPL-CCNC: 12 U/L (ref 10–40)
ANION GAP SERPL CALCULATED.3IONS-SCNC: 12 MMOL/L (ref 3–16)
AST SERPL-CCNC: 16 U/L (ref 15–37)
BILIRUB SERPL-MCNC: 0.8 MG/DL (ref 0–1)
BILIRUBIN DIRECT: <0.2 MG/DL (ref 0–0.3)
BILIRUBIN, INDIRECT: NORMAL MG/DL (ref 0–1)
BUN BLDV-MCNC: 15 MG/DL (ref 7–20)
CALCIUM SERPL-MCNC: 9.6 MG/DL (ref 8.3–10.6)
CHLORIDE BLD-SCNC: 99 MMOL/L (ref 99–110)
CHOLESTEROL, TOTAL: 197 MG/DL (ref 0–199)
CO2: 25 MMOL/L (ref 21–32)
CREAT SERPL-MCNC: 0.6 MG/DL (ref 0.6–1.1)
GFR SERPL CREATININE-BSD FRML MDRD: >60 ML/MIN/{1.73_M2}
GLUCOSE BLD-MCNC: 94 MG/DL (ref 70–99)
HCT VFR BLD CALC: 36.6 % (ref 36–48)
HDLC SERPL-MCNC: 62 MG/DL (ref 40–60)
HEMOGLOBIN: 12.4 G/DL (ref 12–16)
LDL CHOLESTEROL CALCULATED: 117 MG/DL
MCH RBC QN AUTO: 30.2 PG (ref 26–34)
MCHC RBC AUTO-ENTMCNC: 33.8 G/DL (ref 31–36)
MCV RBC AUTO: 89.4 FL (ref 80–100)
PDW BLD-RTO: 13.2 % (ref 12.4–15.4)
PLATELET # BLD: 169 K/UL (ref 135–450)
PMV BLD AUTO: 10.8 FL (ref 5–10.5)
POTASSIUM SERPL-SCNC: 3.7 MMOL/L (ref 3.5–5.1)
RBC # BLD: 4.09 M/UL (ref 4–5.2)
SODIUM BLD-SCNC: 136 MMOL/L (ref 136–145)
TOTAL PROTEIN: 7.5 G/DL (ref 6.4–8.2)
TRIGL SERPL-MCNC: 88 MG/DL (ref 0–150)
TSH SERPL DL<=0.05 MIU/L-ACNC: 6.19 UIU/ML (ref 0.27–4.2)
VLDLC SERPL CALC-MCNC: 18 MG/DL
WBC # BLD: 5.3 K/UL (ref 4–11)

## 2022-11-28 PROCEDURE — 99395 PREV VISIT EST AGE 18-39: CPT | Performed by: FAMILY MEDICINE

## 2022-11-28 ASSESSMENT — ENCOUNTER SYMPTOMS
EYES NEGATIVE: 1
GASTROINTESTINAL NEGATIVE: 1
RESPIRATORY NEGATIVE: 1
ALLERGIC/IMMUNOLOGIC NEGATIVE: 1

## 2022-11-28 NOTE — PROGRESS NOTES
Well Adult Note  Name: Rosa Campo Date: 2022   MRN: [id number] Sex: Female   Age: 33 y.o. Ethnicity: Non- / Non    : 1990 Race: Alvarez Jeffrey is here for well adult exam.  History:  Pt is here for annual physical   Pt is doing well has no concern  She is fasting wants to do blood work  Her thyroid was borderline been feeling fine no symptoms      Review of Systems   Constitutional: Negative. HENT: Negative. Eyes: Negative. Respiratory: Negative. Cardiovascular: Negative. Gastrointestinal: Negative. Endocrine: Negative. Genitourinary: Negative. Musculoskeletal: Negative. Allergic/Immunologic: Negative. Neurological: Negative. Hematological: Negative. Psychiatric/Behavioral: Negative. All other systems reviewed and are negative. No Known Allergies      Prior to Visit Medications    Not on File       No past medical history on file. Past Surgical History:   Procedure Laterality Date    DILATION AND CURETTAGE OF UTERUS N/A 5/15/2020    DILATATION AND CURETTAGE SUCTION performed by Silvio Malin MD at [facility] L&D OR         Family History   Problem Relation Age of Onset    Cancer Neg Hx     Diabetes Neg Hx     Heart Disease Neg Hx     Stroke Neg Hx        Social History     Tobacco Use    Smoking status: Never    Smokeless tobacco: Never   Vaping Use    Vaping Use: Never used   Substance Use Topics    Alcohol use: No    Drug use: No       Objective   /86 (Site: Right Upper Arm, Position: Sitting, Cuff Size: Medium Adult)   Pulse 97   Temp 96.9 °F (36.1 °C) (Infrared)   Ht 5' 4.3\" (1.633 m)   Wt 137 lb 6.4 oz (62.3 kg)   SpO2 98%   BMI 23.37 kg/m²   Wt Readings from Last 3 Encounters:   22 137 lb 6.4 oz (62.3 kg)   10/26/21 137 lb (62.1 kg)   20 147 lb (66.7 kg)     There were no vitals filed for this visit. Physical Exam  Vitals reviewed.    Constitutional:       General: She is not in acute distress. Appearance: She is well-developed. She is not diaphoretic. HENT:      Head: Normocephalic and atraumatic. Right Ear: External ear normal.      Left Ear: External ear normal.      Nose: Nose normal.      Mouth/Throat:      Pharynx: No oropharyngeal exudate. Eyes:      General: No scleral icterus. Right eye: No discharge. Left eye: No discharge. Conjunctiva/sclera: Conjunctivae normal.      Pupils: Pupils are equal, round, and reactive to light. Neck:      Thyroid: No thyromegaly. Vascular: No JVD. Trachea: No tracheal deviation. Cardiovascular:      Rate and Rhythm: Normal rate and regular rhythm. Heart sounds: Normal heart sounds. No murmur heard. No friction rub. No gallop. Pulmonary:      Effort: Pulmonary effort is normal. No respiratory distress. Breath sounds: Normal breath sounds. No stridor. No wheezing or rales. Chest:      Chest wall: No tenderness. Abdominal:      General: Bowel sounds are normal. There is no distension. Palpations: Abdomen is soft. There is no mass. Tenderness: There is no abdominal tenderness. There is no guarding or rebound. Musculoskeletal:         General: No tenderness. Normal range of motion. Cervical back: Normal range of motion and neck supple. Lymphadenopathy:      Cervical: No cervical adenopathy. Skin:     General: Skin is warm and dry. Coloration: Skin is not pale. Findings: No erythema or rash. Neurological:      Mental Status: She is alert and oriented to person, place, and time. Cranial Nerves: No cranial nerve deficit. Motor: No abnormal muscle tone. Coordination: Coordination normal.      Deep Tendon Reflexes: Reflexes are normal and symmetric. Reflexes normal.   Psychiatric:         Behavior: Behavior normal.         Thought Content: Thought content normal.         Judgment: Judgment normal.         Assessment   Plan    Diagnosis Orders   1. Encounter for well adult exam without abnormal findings  CBC    Basic Metabolic Panel    Lipid Panel    Hepatic Function Panel    TSH      2.  Annual physical exam                Personalized Preventive Plan   Current Health Maintenance Status  Immunization History   Administered Date(s) Administered    COVID-19, PFIZER Bivalent BOOSTER, (age 12y+), IM, 30 mcg/0.3 mL dose 11/20/2022    COVID-19, PFIZER PURPLE top, DILUTE for use, (age 15 y+), 30mcg/0.3mL 04/07/2021, 04/26/2021, 11/30/2021    Influenza Virus Vaccine 11/15/2019    Influenza, FLUARIX, FLULAVAL, [address] (age 10 mo+) AND AFLURIA, (age 1 y+), PF, 0.5mL 11/12/2020    Influenza, FLUCELVAX, (age 10 mo+), MDCK, PF, 0.5mL 10/26/2021    Tdap (Boostrix, Adacel) 12/31/2018, 03/20/2020        Health Maintenance   Topic Date Due    Depression Screen  10/26/2022    Cervical cancer screen  10/26/2024    DTaP/Tdap/Td vaccine (3 - Td or Tdap) 03/20/2030    Flu vaccine  Completed    COVID-19 Vaccine  Completed    Hepatitis C screen  Completed    HIV screen  Completed    Hepatitis A vaccine  Aged Out    Hib vaccine  Aged Out    Meningococcal (ACWY) vaccine  Aged Out    Pneumococcal 0-64 years Vaccine  Aged Out    Varicella vaccine  Discontinued     Recommendations for [employer] Due: see orders and patient instructions/AVS.

## 2023-10-30 ENCOUNTER — OFFICE VISIT (OUTPATIENT)
Dept: PRIMARY CARE CLINIC | Age: 33
End: 2023-10-30
Payer: COMMERCIAL

## 2023-10-30 VITALS
HEIGHT: 64 IN | DIASTOLIC BLOOD PRESSURE: 70 MMHG | OXYGEN SATURATION: 98 % | BODY MASS INDEX: 24.51 KG/M2 | HEART RATE: 85 BPM | WEIGHT: 143.58 LBS | SYSTOLIC BLOOD PRESSURE: 110 MMHG

## 2023-10-30 DIAGNOSIS — Z00.00 ENCOUNTER FOR WELL ADULT EXAM WITHOUT ABNORMAL FINDINGS: Primary | ICD-10-CM

## 2023-10-30 DIAGNOSIS — L30.9 DERMATITIS: ICD-10-CM

## 2023-10-30 DIAGNOSIS — Z23 NEED FOR HEPATITIS B VACCINATION: ICD-10-CM

## 2023-10-30 DIAGNOSIS — Z00.00 ENCOUNTER FOR WELL ADULT EXAM WITHOUT ABNORMAL FINDINGS: ICD-10-CM

## 2023-10-30 LAB
ALBUMIN SERPL-MCNC: 4.5 G/DL (ref 3.4–5)
ALP SERPL-CCNC: 47 U/L (ref 40–129)
ALT SERPL-CCNC: 21 U/L (ref 10–40)
ANION GAP SERPL CALCULATED.3IONS-SCNC: 9 MMOL/L (ref 3–16)
AST SERPL-CCNC: 22 U/L (ref 15–37)
BASOPHILS # BLD: 0 K/UL (ref 0–0.2)
BASOPHILS NFR BLD: 0.5 %
BILIRUB DIRECT SERPL-MCNC: <0.2 MG/DL (ref 0–0.3)
BILIRUB INDIRECT SERPL-MCNC: NORMAL MG/DL (ref 0–1)
BILIRUB SERPL-MCNC: 0.5 MG/DL (ref 0–1)
BUN SERPL-MCNC: 13 MG/DL (ref 7–20)
CALCIUM SERPL-MCNC: 9.6 MG/DL (ref 8.3–10.6)
CHLORIDE SERPL-SCNC: 103 MMOL/L (ref 99–110)
CHOLEST SERPL-MCNC: 175 MG/DL (ref 0–199)
CO2 SERPL-SCNC: 27 MMOL/L (ref 21–32)
CREAT SERPL-MCNC: 0.7 MG/DL (ref 0.6–1.1)
DEPRECATED RDW RBC AUTO: 13 % (ref 12.4–15.4)
EOSINOPHIL # BLD: 0 K/UL (ref 0–0.6)
EOSINOPHIL NFR BLD: 1 %
GFR SERPLBLD CREATININE-BSD FMLA CKD-EPI: >60 ML/MIN/{1.73_M2}
GLUCOSE SERPL-MCNC: 81 MG/DL (ref 70–99)
HCT VFR BLD AUTO: 36.3 % (ref 36–48)
HDLC SERPL-MCNC: 57 MG/DL (ref 40–60)
HGB BLD-MCNC: 12.3 G/DL (ref 12–16)
LDLC SERPL CALC-MCNC: 104 MG/DL
LYMPHOCYTES # BLD: 1.9 K/UL (ref 1–5.1)
LYMPHOCYTES NFR BLD: 39.8 %
MCH RBC QN AUTO: 30.6 PG (ref 26–34)
MCHC RBC AUTO-ENTMCNC: 33.9 G/DL (ref 31–36)
MCV RBC AUTO: 90.5 FL (ref 80–100)
MONOCYTES # BLD: 0.6 K/UL (ref 0–1.3)
MONOCYTES NFR BLD: 12.8 %
NEUTROPHILS # BLD: 2.2 K/UL (ref 1.7–7.7)
NEUTROPHILS NFR BLD: 45.9 %
PLATELET # BLD AUTO: 177 K/UL (ref 135–450)
PMV BLD AUTO: 10.6 FL (ref 5–10.5)
POTASSIUM SERPL-SCNC: 4.4 MMOL/L (ref 3.5–5.1)
PROT SERPL-MCNC: 7.4 G/DL (ref 6.4–8.2)
RBC # BLD AUTO: 4.01 M/UL (ref 4–5.2)
SODIUM SERPL-SCNC: 139 MMOL/L (ref 136–145)
TRIGL SERPL-MCNC: 69 MG/DL (ref 0–150)
TSH SERPL DL<=0.005 MIU/L-ACNC: 5.43 UIU/ML (ref 0.27–4.2)
VLDLC SERPL CALC-MCNC: 14 MG/DL
WBC # BLD AUTO: 4.7 K/UL (ref 4–11)

## 2023-10-30 PROCEDURE — 90471 IMMUNIZATION ADMIN: CPT | Performed by: FAMILY MEDICINE

## 2023-10-30 PROCEDURE — 90739 HEPB VACC 2/4 DOSE ADULT IM: CPT | Performed by: FAMILY MEDICINE

## 2023-10-30 PROCEDURE — 99395 PREV VISIT EST AGE 18-39: CPT | Performed by: FAMILY MEDICINE

## 2023-10-30 RX ORDER — MOMETASONE FUROATE 1 MG/G
CREAM TOPICAL
Qty: 45 G | Refills: 0 | Status: SHIPPED | OUTPATIENT
Start: 2023-10-30

## 2023-10-30 RX ORDER — DOXYCYCLINE HYCLATE 100 MG
100 TABLET ORAL 2 TIMES DAILY
Qty: 20 TABLET | Refills: 0 | Status: SHIPPED | OUTPATIENT
Start: 2023-10-30 | End: 2023-11-09

## 2023-10-30 SDOH — ECONOMIC STABILITY: FOOD INSECURITY: WITHIN THE PAST 12 MONTHS, THE FOOD YOU BOUGHT JUST DIDN'T LAST AND YOU DIDN'T HAVE MONEY TO GET MORE.: NEVER TRUE

## 2023-10-30 SDOH — ECONOMIC STABILITY: INCOME INSECURITY: HOW HARD IS IT FOR YOU TO PAY FOR THE VERY BASICS LIKE FOOD, HOUSING, MEDICAL CARE, AND HEATING?: NOT HARD AT ALL

## 2023-10-30 SDOH — ECONOMIC STABILITY: FOOD INSECURITY: WITHIN THE PAST 12 MONTHS, YOU WORRIED THAT YOUR FOOD WOULD RUN OUT BEFORE YOU GOT MONEY TO BUY MORE.: NEVER TRUE

## 2023-10-30 ASSESSMENT — ENCOUNTER SYMPTOMS
RESPIRATORY NEGATIVE: 1
EYES NEGATIVE: 1
ALLERGIC/IMMUNOLOGIC NEGATIVE: 1
GASTROINTESTINAL NEGATIVE: 1

## 2023-10-30 ASSESSMENT — PATIENT HEALTH QUESTIONNAIRE - PHQ9
SUM OF ALL RESPONSES TO PHQ QUESTIONS 1-9: 0
1. LITTLE INTEREST OR PLEASURE IN DOING THINGS: 0
SUM OF ALL RESPONSES TO PHQ9 QUESTIONS 1 & 2: 0
SUM OF ALL RESPONSES TO PHQ QUESTIONS 1-9: 0
2. FEELING DOWN, DEPRESSED OR HOPELESS: 0

## 2023-10-30 NOTE — PROGRESS NOTES
SUBJECTIVE:  Patient ID: Juan Carlos Aguilera is a 29 y.o. y.o. female     HPI   Pt is here for annual physical   Over all doing ok   Concern about rash on both legs, for the last 4-3 months it has been itchy and colored now it is darker, tried a few things over-the-counter with no improvement denies any recall of bug bites or injury or trauma no other skin lesions or any abnormality in the rest of her body. No past medical history on file. Past Surgical History:   Procedure Laterality Date    DILATION AND CURETTAGE OF UTERUS N/A 5/15/2020    DILATATION AND CURETTAGE SUCTION performed by Ashley Callaway MD at [facility] L&D OR     Family History   Problem Relation Age of Onset    Cancer Neg Hx     Diabetes Neg Hx     Heart Disease Neg Hx     Stroke Neg Hx      Social History     Socioeconomic History    Marital status:      Spouse name: None    Number of children: None    Years of education: None    Highest education level: None   Tobacco Use    Smoking status: Never    Smokeless tobacco: Never   Vaping Use    Vaping Use: Never used   Substance and Sexual Activity    Alcohol use: No    Drug use: No    Sexual activity: Yes     Partners: Male     Social Determinants of Health     Financial Resource Strain: Low Risk  (10/30/2023)    Overall Financial Resource Strain (CARDIA)     Difficulty of Paying Living Expenses: Not hard at all   Food Insecurity: No Food Insecurity (10/30/2023)    Hunger Vital Sign     Worried About Running Out of Food in the Last Year: Never true     Ran Out of Food in the Last Year: Never true   Transportation Needs: Unknown (10/30/2023)    PRAPARE - Transportation     Lack of Transportation (Non-Medical):  No   Physical Activity: Sufficiently Active (10/26/2021)    Exercise Vital Sign     Days of Exercise per Week: 5 days     Minutes of Exercise per Session: 60 min   Stress: No Stress Concern Present (10/26/2021)    [facility]

## 2023-11-30 ENCOUNTER — NURSE ONLY (OUTPATIENT)
Dept: PRIMARY CARE CLINIC | Age: 33
End: 2023-11-30
Payer: COMMERCIAL

## 2023-11-30 DIAGNOSIS — Z23 NEED FOR HEPATITIS B VACCINATION: Primary | ICD-10-CM

## 2023-11-30 PROCEDURE — 90471 IMMUNIZATION ADMIN: CPT | Performed by: FAMILY MEDICINE

## 2023-11-30 PROCEDURE — 90739 HEPB VACC 2/4 DOSE ADULT IM: CPT | Performed by: FAMILY MEDICINE

## 2024-10-17 ASSESSMENT — PATIENT HEALTH QUESTIONNAIRE - PHQ9
1. LITTLE INTEREST OR PLEASURE IN DOING THINGS: NOT AT ALL
SUM OF ALL RESPONSES TO PHQ9 QUESTIONS 1 & 2: 0
SUM OF ALL RESPONSES TO PHQ9 QUESTIONS 1 & 2: 0
SUM OF ALL RESPONSES TO PHQ QUESTIONS 1-9: 0
2. FEELING DOWN, DEPRESSED OR HOPELESS: NOT AT ALL
SUM OF ALL RESPONSES TO PHQ QUESTIONS 1-9: 0
1. LITTLE INTEREST OR PLEASURE IN DOING THINGS: NOT AT ALL
SUM OF ALL RESPONSES TO PHQ QUESTIONS 1-9: 0
SUM OF ALL RESPONSES TO PHQ QUESTIONS 1-9: 0
2. FEELING DOWN, DEPRESSED OR HOPELESS: NOT AT ALL

## 2024-10-18 ENCOUNTER — OFFICE VISIT (OUTPATIENT)
Dept: PRIMARY CARE CLINIC | Age: 34
End: 2024-10-18

## 2024-10-18 VITALS
WEIGHT: 144 LBS | TEMPERATURE: 96.8 F | HEIGHT: 64 IN | SYSTOLIC BLOOD PRESSURE: 108 MMHG | BODY MASS INDEX: 24.59 KG/M2 | OXYGEN SATURATION: 100 % | HEART RATE: 88 BPM | DIASTOLIC BLOOD PRESSURE: 70 MMHG | RESPIRATION RATE: 16 BRPM

## 2024-10-18 DIAGNOSIS — M25.561 PAIN IN BOTH KNEES, UNSPECIFIED CHRONICITY: ICD-10-CM

## 2024-10-18 DIAGNOSIS — Z00.00 ENCOUNTER FOR WELL ADULT EXAM WITHOUT ABNORMAL FINDINGS: Primary | ICD-10-CM

## 2024-10-18 DIAGNOSIS — M25.562 PAIN IN BOTH KNEES, UNSPECIFIED CHRONICITY: ICD-10-CM

## 2024-10-18 DIAGNOSIS — Z00.00 ENCOUNTER FOR WELL ADULT EXAM WITHOUT ABNORMAL FINDINGS: ICD-10-CM

## 2024-10-18 DIAGNOSIS — Z23 NEED FOR INFLUENZA VACCINATION: ICD-10-CM

## 2024-10-18 LAB
BASOPHILS # BLD: 0 K/UL (ref 0–0.2)
BASOPHILS NFR BLD: 0.5 %
DEPRECATED RDW RBC AUTO: 13.6 % (ref 12.4–15.4)
EOSINOPHIL # BLD: 0.1 K/UL (ref 0–0.6)
EOSINOPHIL NFR BLD: 1.7 %
HCT VFR BLD AUTO: 38 % (ref 36–48)
HGB BLD-MCNC: 12.9 G/DL (ref 12–16)
LYMPHOCYTES # BLD: 1.7 K/UL (ref 1–5.1)
LYMPHOCYTES NFR BLD: 37.6 %
MCH RBC QN AUTO: 31 PG (ref 26–34)
MCHC RBC AUTO-ENTMCNC: 33.8 G/DL (ref 31–36)
MCV RBC AUTO: 91.8 FL (ref 80–100)
MONOCYTES # BLD: 0.3 K/UL (ref 0–1.3)
MONOCYTES NFR BLD: 7.2 %
NEUTROPHILS # BLD: 2.4 K/UL (ref 1.7–7.7)
NEUTROPHILS NFR BLD: 53 %
PLATELET # BLD AUTO: 194 K/UL (ref 135–450)
PMV BLD AUTO: 10.5 FL (ref 5–10.5)
RBC # BLD AUTO: 4.14 M/UL (ref 4–5.2)
WBC # BLD AUTO: 4.6 K/UL (ref 4–11)

## 2024-10-18 SDOH — ECONOMIC STABILITY: INCOME INSECURITY: HOW HARD IS IT FOR YOU TO PAY FOR THE VERY BASICS LIKE FOOD, HOUSING, MEDICAL CARE, AND HEATING?: NOT HARD AT ALL

## 2024-10-18 SDOH — ECONOMIC STABILITY: FOOD INSECURITY: WITHIN THE PAST 12 MONTHS, YOU WORRIED THAT YOUR FOOD WOULD RUN OUT BEFORE YOU GOT MONEY TO BUY MORE.: NEVER TRUE

## 2024-10-18 SDOH — ECONOMIC STABILITY: FOOD INSECURITY: WITHIN THE PAST 12 MONTHS, THE FOOD YOU BOUGHT JUST DIDN'T LAST AND YOU DIDN'T HAVE MONEY TO GET MORE.: NEVER TRUE

## 2024-10-18 ASSESSMENT — ENCOUNTER SYMPTOMS
RESPIRATORY NEGATIVE: 1
ALLERGIC/IMMUNOLOGIC NEGATIVE: 1
GASTROINTESTINAL NEGATIVE: 1
EYES NEGATIVE: 1

## 2024-10-18 NOTE — PROGRESS NOTES
Well Adult Note  Name: Esme Trent Today’s Date: 10/18/2024   MRN: [id number] Sex: Female   Age: 34 y.o. Ethnicity: Non- / Non    : 1990 Race: Other       Esme Trent is here for a well adult exam.       Subjective   History:  Pt is here for annual physical exam   Overall doing well occasional knee pain on and off no trigger no trauma no injury no fall when she does extra activity  Review of Systems   Constitutional: Negative.    HENT: Negative.     Eyes: Negative.    Respiratory: Negative.     Cardiovascular: Negative.    Gastrointestinal: Negative.    Endocrine: Negative.    Genitourinary: Negative.    Musculoskeletal:  Positive for arthralgias.   Allergic/Immunologic: Negative.    Neurological: Negative.    Hematological: Negative.    Psychiatric/Behavioral: Negative.     All other systems reviewed and are negative.      No Known Allergies  Prior to Visit Medications    Medication Sig Taking? Authorizing Provider   mometasone (ELOCON) 0.1 % cream Apply topically daily. Yes Gavino Mckinney MD     No past medical history on file.  Past Surgical History:   Procedure Laterality Date    DILATION AND CURETTAGE OF UTERUS N/A 5/15/2020    DILATATION AND CURETTAGE SUCTION performed by Janell Mayberry MD at [facility] L&D OR     Family History   Problem Relation Age of Onset    Cancer Neg Hx     Diabetes Neg Hx     Heart Disease Neg Hx     Stroke Neg Hx      Social History     Tobacco Use    Smoking status: Never    Smokeless tobacco: Never   Vaping Use    Vaping status: Never Used   Substance Use Topics    Alcohol use: No    Drug use: No           Objective   Vital Signs  /70 (Site: Left Upper Arm, Position: Sitting, Cuff Size: Small Adult)   Pulse 88   Temp 96.8 °F (36 °C)   Resp 16   Ht 1.626 m (5' 4\")   Wt 65.3 kg (144 lb)   LMP 2024 (Exact Date)   SpO2 100%   BMI 24.72 kg/m²     Wt Readings from Last 3 Encounters:   10/18/24 65.3 kg (144 lb)   10/30/23 65.1 kg (143 lb 9.3

## 2024-10-19 LAB
ALBUMIN SERPL-MCNC: 4.7 G/DL (ref 3.4–5)
ALP SERPL-CCNC: 44 U/L (ref 40–129)
ALT SERPL-CCNC: 14 U/L (ref 10–40)
ANION GAP SERPL CALCULATED.3IONS-SCNC: 12 MMOL/L (ref 3–16)
AST SERPL-CCNC: 17 U/L (ref 15–37)
BILIRUB DIRECT SERPL-MCNC: 0.2 MG/DL (ref 0–0.3)
BILIRUB INDIRECT SERPL-MCNC: 0.3 MG/DL (ref 0–1)
BILIRUB SERPL-MCNC: 0.5 MG/DL (ref 0–1)
BUN SERPL-MCNC: 13 MG/DL (ref 7–20)
CALCIUM SERPL-MCNC: 9.7 MG/DL (ref 8.3–10.6)
CHLORIDE SERPL-SCNC: 104 MMOL/L (ref 99–110)
CHOLEST SERPL-MCNC: 192 MG/DL (ref 0–199)
CO2 SERPL-SCNC: 23 MMOL/L (ref 21–32)
CREAT SERPL-MCNC: 0.7 MG/DL (ref 0.6–1.1)
GFR SERPLBLD CREATININE-BSD FMLA CKD-EPI: >90 ML/MIN/{1.73_M2}
GLUCOSE SERPL-MCNC: 83 MG/DL (ref 70–99)
HDLC SERPL-MCNC: 62 MG/DL (ref 40–60)
LDLC SERPL CALC-MCNC: 112 MG/DL
POTASSIUM SERPL-SCNC: 4.4 MMOL/L (ref 3.5–5.1)
PROT SERPL-MCNC: 7.6 G/DL (ref 6.4–8.2)
SODIUM SERPL-SCNC: 139 MMOL/L (ref 136–145)
TRIGL SERPL-MCNC: 89 MG/DL (ref 0–150)
TSH SERPL DL<=0.005 MIU/L-ACNC: 5.14 UIU/ML (ref 0.27–4.2)
VLDLC SERPL CALC-MCNC: 18 MG/DL